# Patient Record
Sex: FEMALE | Race: WHITE | NOT HISPANIC OR LATINO | URBAN - METROPOLITAN AREA
[De-identification: names, ages, dates, MRNs, and addresses within clinical notes are randomized per-mention and may not be internally consistent; named-entity substitution may affect disease eponyms.]

---

## 2019-08-10 ENCOUNTER — EMERGENCY (EMERGENCY)
Facility: HOSPITAL | Age: 64
LOS: 1 days | Discharge: HOME | End: 2019-08-10
Attending: EMERGENCY MEDICINE
Payer: MEDICARE

## 2019-08-10 VITALS
WEIGHT: 177.03 LBS | DIASTOLIC BLOOD PRESSURE: 78 MMHG | TEMPERATURE: 98 F | SYSTOLIC BLOOD PRESSURE: 125 MMHG | RESPIRATION RATE: 16 BRPM | HEIGHT: 66 IN | HEART RATE: 111 BPM | OXYGEN SATURATION: 98 %

## 2019-08-10 VITALS
OXYGEN SATURATION: 97 % | RESPIRATION RATE: 18 BRPM | SYSTOLIC BLOOD PRESSURE: 146 MMHG | TEMPERATURE: 98 F | DIASTOLIC BLOOD PRESSURE: 90 MMHG | HEART RATE: 110 BPM

## 2019-08-10 PROBLEM — Z00.00 ENCOUNTER FOR PREVENTIVE HEALTH EXAMINATION: Status: ACTIVE | Noted: 2019-08-10

## 2019-08-10 PROCEDURE — 99283 EMERGENCY DEPT VISIT LOW MDM: CPT

## 2019-08-10 PROCEDURE — 99282 EMERGENCY DEPT VISIT SF MDM: CPT

## 2019-08-10 NOTE — ED ADULT NURSE NOTE - OBJECTIVE STATEMENT
Pt states felt weak after eating lunch, she was found by staff to have tachycardia and sent here for evaluation  No orders received from MD  Pt was observed for a while symptoms improved, was DC ed home via [facility]

## 2019-08-10 NOTE — ED PROVIDER NOTE - OBJECTIVE STATEMENT
I was sent here bc my bp was high I was sent here bc my bp was high  pt states " I feel fine"  no ha no cp no sob  felt light headed earlier but none now.

## 2019-08-10 NOTE — ED ADULT NURSE NOTE - CHPI ED NUR SYMPTOMS NEG
no dizziness/no fever/no decreased eating/drinking/no chills/no nausea/no tingling/no vomiting/no pain

## 2019-08-10 NOTE — ED PROVIDER NOTE - CLINICAL SUMMARY MEDICAL DECISION MAKING FREE TEXT BOX
pt with htn   sent from [facility] home  bp normal here  pt asking to go home  ambulatory in the department without asst

## 2019-10-11 ENCOUNTER — EMERGENCY (EMERGENCY)
Facility: HOSPITAL | Age: 64
LOS: 1 days | Discharge: HOME | End: 2019-10-11
Attending: EMERGENCY MEDICINE
Payer: MEDICARE

## 2019-10-11 VITALS
RESPIRATION RATE: 18 BRPM | TEMPERATURE: 99 F | SYSTOLIC BLOOD PRESSURE: 117 MMHG | OXYGEN SATURATION: 100 % | DIASTOLIC BLOOD PRESSURE: 73 MMHG | HEART RATE: 93 BPM

## 2019-10-11 VITALS
HEART RATE: 98 BPM | DIASTOLIC BLOOD PRESSURE: 98 MMHG | TEMPERATURE: 98 F | RESPIRATION RATE: 202 BRPM | OXYGEN SATURATION: 99 % | SYSTOLIC BLOOD PRESSURE: 127 MMHG

## 2019-10-11 DIAGNOSIS — F25.9 SCHIZOAFFECTIVE DISORDER, UNSPECIFIED: ICD-10-CM

## 2019-10-11 LAB
ALBUMIN SERPL ELPH-MCNC: 3.5 G/DL — SIGNIFICANT CHANGE UP (ref 3.5–5)
ALP SERPL-CCNC: 92 U/L — SIGNIFICANT CHANGE UP (ref 40–120)
ALT FLD-CCNC: 24 U/L DA — SIGNIFICANT CHANGE UP (ref 10–60)
ANION GAP SERPL CALC-SCNC: 4 MMOL/L — LOW (ref 5–17)
APAP SERPL-MCNC: <2 UG/ML — LOW (ref 10–30)
APPEARANCE UR: CLEAR — SIGNIFICANT CHANGE UP
AST SERPL-CCNC: 23 U/L — SIGNIFICANT CHANGE UP (ref 10–40)
BASOPHILS # BLD AUTO: 0.02 K/UL — SIGNIFICANT CHANGE UP (ref 0–0.2)
BASOPHILS NFR BLD AUTO: 0.3 % — SIGNIFICANT CHANGE UP (ref 0–2)
BILIRUB SERPL-MCNC: 0.2 MG/DL — SIGNIFICANT CHANGE UP (ref 0.2–1.2)
BILIRUB UR-MCNC: NEGATIVE — SIGNIFICANT CHANGE UP
BUN SERPL-MCNC: 11 MG/DL — SIGNIFICANT CHANGE UP (ref 7–18)
CALCIUM SERPL-MCNC: 9.5 MG/DL — SIGNIFICANT CHANGE UP (ref 8.4–10.5)
CHLORIDE SERPL-SCNC: 105 MMOL/L — SIGNIFICANT CHANGE UP (ref 96–108)
CO2 SERPL-SCNC: 29 MMOL/L — SIGNIFICANT CHANGE UP (ref 22–31)
COLOR SPEC: YELLOW — SIGNIFICANT CHANGE UP
CREAT SERPL-MCNC: 0.85 MG/DL — SIGNIFICANT CHANGE UP (ref 0.5–1.3)
DIFF PNL FLD: ABNORMAL
EOSINOPHIL # BLD AUTO: 0.03 K/UL — SIGNIFICANT CHANGE UP (ref 0–0.5)
EOSINOPHIL NFR BLD AUTO: 0.5 % — SIGNIFICANT CHANGE UP (ref 0–6)
ETHANOL SERPL-MCNC: <3 MG/DL — SIGNIFICANT CHANGE UP (ref 0–10)
GLUCOSE SERPL-MCNC: 116 MG/DL — HIGH (ref 70–99)
GLUCOSE UR QL: NEGATIVE — SIGNIFICANT CHANGE UP
HCT VFR BLD CALC: 37.1 % — SIGNIFICANT CHANGE UP (ref 34.5–45)
HGB BLD-MCNC: 11.6 G/DL — SIGNIFICANT CHANGE UP (ref 11.5–15.5)
IMM GRANULOCYTES NFR BLD AUTO: 0.3 % — SIGNIFICANT CHANGE UP (ref 0–1.5)
KETONES UR-MCNC: NEGATIVE — SIGNIFICANT CHANGE UP
LEUKOCYTE ESTERASE UR-ACNC: ABNORMAL
LYMPHOCYTES # BLD AUTO: 2.14 K/UL — SIGNIFICANT CHANGE UP (ref 1–3.3)
LYMPHOCYTES # BLD AUTO: 32.1 % — SIGNIFICANT CHANGE UP (ref 13–44)
MCHC RBC-ENTMCNC: 31.3 GM/DL — LOW (ref 32–36)
MCHC RBC-ENTMCNC: 31.4 PG — SIGNIFICANT CHANGE UP (ref 27–34)
MCV RBC AUTO: 100.5 FL — HIGH (ref 80–100)
MONOCYTES # BLD AUTO: 0.66 K/UL — SIGNIFICANT CHANGE UP (ref 0–0.9)
MONOCYTES NFR BLD AUTO: 9.9 % — SIGNIFICANT CHANGE UP (ref 2–14)
NEUTROPHILS # BLD AUTO: 3.79 K/UL — SIGNIFICANT CHANGE UP (ref 1.8–7.4)
NEUTROPHILS NFR BLD AUTO: 56.9 % — SIGNIFICANT CHANGE UP (ref 43–77)
NITRITE UR-MCNC: NEGATIVE — SIGNIFICANT CHANGE UP
NRBC # BLD: 0 /100 WBCS — SIGNIFICANT CHANGE UP (ref 0–0)
PH UR: 7 — SIGNIFICANT CHANGE UP (ref 5–8)
PLATELET # BLD AUTO: 320 K/UL — SIGNIFICANT CHANGE UP (ref 150–400)
POTASSIUM SERPL-MCNC: 3.8 MMOL/L — SIGNIFICANT CHANGE UP (ref 3.5–5.3)
POTASSIUM SERPL-SCNC: 3.8 MMOL/L — SIGNIFICANT CHANGE UP (ref 3.5–5.3)
PROT SERPL-MCNC: 7.6 G/DL — SIGNIFICANT CHANGE UP (ref 6–8.3)
PROT UR-MCNC: NEGATIVE — SIGNIFICANT CHANGE UP
RBC # BLD: 3.69 M/UL — LOW (ref 3.8–5.2)
RBC # FLD: 13.7 % — SIGNIFICANT CHANGE UP (ref 10.3–14.5)
SALICYLATES SERPL-MCNC: <1.7 MG/DL — LOW (ref 2.8–20)
SODIUM SERPL-SCNC: 138 MMOL/L — SIGNIFICANT CHANGE UP (ref 135–145)
SP GR SPEC: 1.01 — SIGNIFICANT CHANGE UP (ref 1.01–1.02)
TSH SERPL-MCNC: 1.53 UU/ML — SIGNIFICANT CHANGE UP (ref 0.34–4.82)
UROBILINOGEN FLD QL: NEGATIVE — SIGNIFICANT CHANGE UP
WBC # BLD: 6.66 K/UL — SIGNIFICANT CHANGE UP (ref 3.8–10.5)
WBC # FLD AUTO: 6.66 K/UL — SIGNIFICANT CHANGE UP (ref 3.8–10.5)

## 2019-10-11 PROCEDURE — 99285 EMERGENCY DEPT VISIT HI MDM: CPT

## 2019-10-11 PROCEDURE — 71045 X-RAY EXAM CHEST 1 VIEW: CPT | Mod: 26

## 2019-10-11 PROCEDURE — 90792 PSYCH DIAG EVAL W/MED SRVCS: CPT | Mod: GT

## 2019-10-11 RX ORDER — OLANZAPINE 15 MG/1
5 TABLET, FILM COATED ORAL ONCE
Refills: 0 | Status: COMPLETED | OUTPATIENT
Start: 2019-10-11 | End: 2019-10-11

## 2019-10-11 RX ORDER — OLANZAPINE 15 MG/1
1 TABLET, FILM COATED ORAL
Qty: 14 | Refills: 0
Start: 2019-10-11 | End: 2019-10-17

## 2019-10-11 NOTE — ED BEHAVIORAL HEALTH ASSESSMENT NOTE - SUICIDE PROTECTIVE FACTORS
Cultural, spiritual and/or moral attitudes against suicide/Positive therapeutic relationships/Supportive social network of family or friends/Identifies reasons for living

## 2019-10-11 NOTE — ED ADULT TRIAGE NOTE - CHIEF COMPLAINT QUOTE
Patient is requesting to go to a psychiatric facility despite ems stated complaint of weakness.  Has not answered suicidal or homicidal ideation inquiries.  Placed on yellow gown protocol

## 2019-10-11 NOTE — ED BEHAVIORAL HEALTH ASSESSMENT NOTE - HPI (INCLUDE ILLNESS QUALITY, SEVERITY, DURATION, TIMING, CONTEXT, MODIFYING FACTORS, ASSOCIATED SIGNS AND SYMPTOMS)
Patient is a 64 year old female, unemployed, in a relationship and currently living at [facility]. She has a past psychiatric history of schizoaffective disorder, multiple inpatient hospitalizations ([facility]), current outpatient treatment with no history of substance use or suicidality. Psychiatry was consulted for evaluation due to “flat affect” and odd behavior.     Patient was discharged from [facility] psychiatry today after a two week hospitalization. Once she arrived back to [facility], she was complaining of weakness and stating “I feel like I’m dying.” On exam, patient reports she “wasn’t feeling well” and wanted to come to the hospital. Of note, patient is a poor historian. She was perseverating on how she “most [my] psychic ability” and she doesn’t know when she will get it back. She did report being discharged from [facility] today but was unable to give information about her hospitalization. She continues to state “all I know is that I lost my psychic ability” when attempting to get more information. She reports feeling sad due to her lost ability but denies any other symptoms of depression. She denies any recent changes in her mood and denies any symptoms of anxiety, ritchie or psychosis. She denies any SI/HI, stating how she remains hopeful about the future. She discussed how she has been living at the [facility] for over 13 years and likes it there. She reports having a friend, Curry, who is supportive of her.     Spoke with patient’s inpatient psychiatrist, Dr. Winters ([phone]), who reports patient was discharged from [facility] unit today. Reports patient was sent to psychiatry due to “anxiety, panic attacks and delusions.” She reports at baseline, patient is psychotic and delusional about psychic abilities. She is also somatically preoccupied. Reports patient doesn’t like her assisted living facility and likes to come to the ER since she likes being around people. She reports patient is currently taking Zyprexa 5mg bid and is adherent with medication. She denies any history of suicidality or aggressive behavior. Denies any acute safety concerns. Reports patient is at her psychiatric baseline and is to follow-up with her psychiatric at [facility].     Spoke with patient’s boyfriend, Curry Lanier ([phone]) who reports patient is currently at her psychiatric baseline. Reports patient was just discharged from the hospital today due to bizarre behavior. Reports patient thinks she’s a psychic that can read peoples mind, which has been a chronic delusion of hers. Denies having any acute safety concerns. Reports she has a therapist, Dr. Mya Viveros she follows up “religiously” with at the nursing home.

## 2019-10-11 NOTE — ED BEHAVIORAL HEALTH ASSESSMENT NOTE - OTHER PAST PSYCHIATRIC HISTORY (INCLUDE DETAILS REGARDING ONSET, COURSE OF ILLNESS, INPATIENT/OUTPATIENT TREATMENT)
As per HPI. History of schizoaffective disorder, multiple inpatient admissions, recently discharged from [facility] today, 10/11/19. Currently sees outpatient psychiatrist and therapist at [facility].

## 2019-10-11 NOTE — ED PROVIDER NOTE - OBJECTIVE STATEMENT
Patient is a 65 y/o female with PMHx schizoaffective disorder (out of [facility]) sent in from [facility] where she has been residing for the past day for bizarre behavior; she is unable to contribute to hx. Her boyfriend is at bedside providing info. The nursing home paperwork just states weakness and schizoaffective disorder. She is not taking any psychotropic medications besides Remeron. NKDA.

## 2019-10-11 NOTE — ED BEHAVIORAL HEALTH ASSESSMENT NOTE - RISK ASSESSMENT
Additional Suicide Risk Factors (select all that apply)  [  ]Access to lethal means including firearms  [  ]Family history of suicide  [  ]Impulsivity  [ X ] Current or past mood disorder  [ X ] Current or past psychotic disorder  [  ] Current or past PTSD  [  ] Current or past ADHD  [  ] Current or past TBI  [  ] Current or past cluster B personality disorder or traits  [  ] Current or past conduct problems  [ X ] Recent onset of current or past psychiatric disorder  [  ] Family history of psychiatric diagnoses requiring hospitalization     Additional Activating Events (select all that apply)  [  ]Perceived burden on family or others  [  ]Current sexual or physical abuse  [  ]Substance intoxication or withdrawal  [  ]Inadequate social supports  [  ]Hopeless about or dissatisfied with current provider or treatment     Additional Protective Factors (select all that apply)  [ X ] Future plans  [  ] Judaism beliefs  [  ] Beloved pets Low Acute Suicide Risk

## 2019-10-11 NOTE — ED BEHAVIORAL HEALTH ASSESSMENT NOTE - DETAILS
Discharged from [facility] Denies Spoke with attending psychiatrist Spoke with patient’s inpatient psychiatrist, Dr. Winters ([phone]

## 2019-10-11 NOTE — ED PROVIDER NOTE - PROGRESS NOTE DETAILS
Discussed with telepsych. Will see pt. joellen telepsych who saw/examined/dw the inpt psychiatry md at [facility] who was seeing her today and prior. pt is at baseline, should be on zyprexa 5mg bid. no finding right now that explains weakness but pt wo any objective findings - will dc back to nursing facility. pt does not know which pharmacy - likely causing pt harm if pt does not get medication today. will print paper presciption instead of erx for this reason.

## 2019-10-11 NOTE — ED PROVIDER NOTE - PATIENT PORTAL LINK FT
You can access the FollowMyHealth Patient Portal offered by [facility] by registering at the following website: [email or web address]. By joining [employer]’s FollowMyHealth portal, you will also be able to view your health information using other applications (apps) compatible with our system.

## 2019-10-11 NOTE — ED BEHAVIORAL HEALTH ASSESSMENT NOTE - OTHER
Inpatient psychiatrist, Dr. Winters Nursing home Recently discharged from inpatient at [facility]

## 2019-10-11 NOTE — ED ADULT NURSE NOTE - OBJECTIVE STATEMENT
axox3 ,nad , brought for assisted living with boyfriend at bed side for bizarre behavior , pt on arrival 1;1 observation , ROAM alert , no suicidal or homicidal thoughts since arrival

## 2019-10-11 NOTE — ED BEHAVIORAL HEALTH ASSESSMENT NOTE - ACTIVATING EVENTS/STRESSORS
Recent inpatient discharge/Change in provider or treatment (i.e., medications, psychotherapy, milieu)

## 2019-10-11 NOTE — ED PROVIDER NOTE - CLINICAL SUMMARY MEDICAL DECISION MAKING FREE TEXT BOX
63 y/o female pt presents with bizarre behavior. Will eval for source of infection. Basic bloodwork, consult psych for observation.

## 2019-10-11 NOTE — ED BEHAVIORAL HEALTH ASSESSMENT NOTE - DIFFERENTIAL
Details: ___________  [  ] Safety plan discussed with patient  [  ] Education provided regarding environmental safety / lethal means restriction  [  ] Provision of National Suicide Prevention Lifeline 1-733-120-SSHW (4183)     C-SSRS Screener     1. Have you ever wished to be dead or wished you could go to sleep and not wake up?  [  ]Yes, [ X ]No, [  ]Unable to Assess     2. Have you actually had any thoughts of killing yourself?   [  ]Yes, [ X ]No, [  ]Unable to Assess     If answer is “No” for 1 and 2, stop here. If answer is “Yes” to 1 or 2, proceed to 3.     3. Have you been thinking about how you might kill yourself?  [  ]Yes, [  ]No, [  ]Unable to Assess     4. Have you had these thoughts and had some intention of acting on them?  [  ]Yes, [  ]No, [  ]Unable to Assess     5. Have you started to work out or worked out the details of how to kill yourself? Do you intend to carry out this plan?  [  ]Yes, [  ]No, [  ]Unable to Assess     6. Have you ever done anything, started to do anything, or prepared to do anything to end your life? If so, was it in the past 3 months?  [  ]Yes, [  ]No, [  ]Unable to Assess    Details:_________________________________    Acute Suicide Risk  (  ) High   (  ) Moderate   (  ) Low   (  ) Unable to determine   Rationale ___________    Elevated Chronic Risk   (  ) Yes ___________  Details ___________  (  ) No   ___________

## 2019-10-11 NOTE — ED BEHAVIORAL HEALTH ASSESSMENT NOTE - MARITAL STATUS
----- Message from Ira Bang MD sent at 11/8/2018  5:51 PM CST -----  Lesions 1 and 2, referral to Dr Fernandez for Mohs (recently under her care)  Lesions 3 and 5, schedule for E&S and ED&C respectively  Lesion 4, negative biopsy margins, will reexamine at FU  Lesion 6 benign    FINAL PATHOLOGIC DIAGNOSIS  1. Skin, right forehead, shave biopsy:  - BASAL CELL CARCINOMA WITH MIXED SUPERFICIAL AND NODULAR GROWTH PATTERN.  - THE TUMOR CLOSELY APPROACHES THE DEEP BIOPSY MARGIN.    2. Skin, right nasal tip, shave biopsy:  - BASAL CELL CARCINOMA WITH NODULAR GROWTH PATTERN.  - THE TUMOR EXTENDS TO THE DEEP AND LATERAL BIOPSY MARGINS.    3. Skin, left infra-auricular neck, shave biopsy:  - BASAL CELL CARCINOMA WITH MIXED SUPERFICIAL AND NODULAR GROWTH PATTERN.  - THE TUMOR EXTENDS TO A LATERAL BIOPSY MARGIN.    4. Skin, left postauricular neck, shave biopsy:  - SQUAMOUS CELL CARCINOMA IN SITU/ BOWEN'S DISEASE.  - MARGINS ARE NEGATIVE IN THE PLANES OF SECTION.    5. Skin, left shoulder, shave biopsy:  - BASAL CELL CARCINOMA WITH MIXED SUPERFICIAL AND NODULAR GROWTH PATTERN.  - THE TUMOR EXTENDS TO A LATERAL BIOPSY MARGIN.    6. Skin, right upper arm, shave biopsy:  - IRRITATED SEBORRHEIC KERATOSIS.    
Single

## 2019-10-11 NOTE — ED BEHAVIORAL HEALTH ASSESSMENT NOTE - DESCRIPTION
Patient was calm and cooperative. She was accompanied by her friend, Curry. She was cooperative with labs and changing into her gown. She was observed to be anxious however able to be redirectable.  She has not been agitated or aggressive. Denies Patient currently lives at [facility]

## 2019-10-11 NOTE — ED PROVIDER NOTE - PHYSICAL EXAMINATION
Slow movements.   Repeated statements that she was being given medications under the name "Valois" but they were calling her "Sachi" multiple times. Patient cannot be redirected or contribute to history.  Significantly flat affect.

## 2019-10-11 NOTE — ED PROVIDER NOTE - NSFOLLOWUPINSTRUCTIONS_ED_ALL_ED_FT
You should be taking zyprexa 5 mg - 2x per day according to your psychiatrist.    Please follow up with your personal medical doctor in 24-48 hours.   Bring results from today to your visit.  If your symptoms change, get worse or if you have any new symptoms, come to the ER right away.  If you have any questions, call the ER at the phone number on this page.

## 2019-10-11 NOTE — ED BEHAVIORAL HEALTH ASSESSMENT NOTE - SUMMARY
Patient is a 64 year old female, unemployed, in a relationship and currently living at [facility] with a past psychiatric history of schizoaffective disorder, multiple inpatient hospitalizations [facility] current outpatient treatment with no history of substance use or suicidality who was consulted for evaluation due to “flat affect” and odd behavior.  On exam, patient continues to be delusional about her psychic abilities. As per her inpatient psychiatrist and her friend this is her baseline. Patient is currently on psychiatric medication (Zyprexa 5mg po bid) and has psychiatric follow-up at [facility]. Patient appears to be at her psychiatric baseline. She denies any symptoms of depression, ritchie or psychosis. Patient does not meet criteria for involuntary hospitalization and is not interested in voluntary hospitalization. She denies any SI/HI and friend/inpatient psych have no acute safety concerns. Patient is psychiatrically stable for discharge.    Plan:  -Patient is psychiatrically stable for discharge  - Patient does not meet criteria for involuntary hospitalization and is not interested in voluntary hospitalization  -Continue Zyprexa 5mg po bid  -Patient has a psychiatrist and therapist at [facility]  -In case of emergency, patient was educated to call 911 and/or go to the nearest ER

## 2019-10-11 NOTE — ED ADULT NURSE NOTE - NSIMPLEMENTINTERV_GEN_ALL_ED
Implemented All Universal Safety Interventions:  [place] to call system. Call bell, personal items and telephone within reach. Instruct patient to call for assistance. Room bathroom lighting operational. Non-slip footwear when patient is off stretcher. Physically safe environment: no spills, clutter or unnecessary equipment. Stretcher in lowest position, wheels locked, appropriate side rails in place.

## 2019-10-12 PROCEDURE — 85027 COMPLETE CBC AUTOMATED: CPT

## 2019-10-12 PROCEDURE — 84443 ASSAY THYROID STIM HORMONE: CPT

## 2019-10-12 PROCEDURE — 80307 DRUG TEST PRSMV CHEM ANLYZR: CPT

## 2019-10-12 PROCEDURE — 99285 EMERGENCY DEPT VISIT HI MDM: CPT | Mod: 25

## 2019-10-12 PROCEDURE — 81001 URINALYSIS AUTO W/SCOPE: CPT

## 2019-10-12 PROCEDURE — 80053 COMPREHEN METABOLIC PANEL: CPT

## 2019-10-12 PROCEDURE — 71045 X-RAY EXAM CHEST 1 VIEW: CPT

## 2019-10-12 PROCEDURE — 36415 COLL VENOUS BLD VENIPUNCTURE: CPT

## 2019-10-12 RX ADMIN — OLANZAPINE 5 MILLIGRAM(S): 15 TABLET, FILM COATED ORAL at 00:55

## 2019-12-04 ENCOUNTER — EMERGENCY (EMERGENCY)
Facility: HOSPITAL | Age: 64
LOS: 1 days | Discharge: HOME | End: 2019-12-04
Attending: EMERGENCY MEDICINE
Payer: MEDICARE

## 2019-12-04 VITALS
RESPIRATION RATE: 16 BRPM | DIASTOLIC BLOOD PRESSURE: 72 MMHG | TEMPERATURE: 98 F | OXYGEN SATURATION: 100 % | SYSTOLIC BLOOD PRESSURE: 132 MMHG | HEART RATE: 97 BPM

## 2019-12-04 VITALS
OXYGEN SATURATION: 98 % | SYSTOLIC BLOOD PRESSURE: 131 MMHG | DIASTOLIC BLOOD PRESSURE: 84 MMHG | RESPIRATION RATE: 18 BRPM | WEIGHT: 179.9 LBS | HEART RATE: 96 BPM | TEMPERATURE: 98 F

## 2019-12-04 PROBLEM — F25.9 SCHIZOAFFECTIVE DISORDER, UNSPECIFIED: Chronic | Status: ACTIVE | Noted: 2019-10-11

## 2019-12-04 LAB
ALBUMIN SERPL ELPH-MCNC: 3.5 G/DL — SIGNIFICANT CHANGE UP (ref 3.5–5)
ALP SERPL-CCNC: 87 U/L — SIGNIFICANT CHANGE UP (ref 40–120)
ALT FLD-CCNC: 11 U/L DA — SIGNIFICANT CHANGE UP (ref 10–60)
ANION GAP SERPL CALC-SCNC: 4 MMOL/L — LOW (ref 5–17)
AST SERPL-CCNC: 10 U/L — SIGNIFICANT CHANGE UP (ref 10–40)
BASOPHILS # BLD AUTO: 0.04 K/UL — SIGNIFICANT CHANGE UP (ref 0–0.2)
BASOPHILS NFR BLD AUTO: 0.4 % — SIGNIFICANT CHANGE UP (ref 0–2)
BILIRUB SERPL-MCNC: 0.2 MG/DL — SIGNIFICANT CHANGE UP (ref 0.2–1.2)
BUN SERPL-MCNC: 12 MG/DL — SIGNIFICANT CHANGE UP (ref 7–18)
CALCIUM SERPL-MCNC: 9.2 MG/DL — SIGNIFICANT CHANGE UP (ref 8.4–10.5)
CHLORIDE SERPL-SCNC: 111 MMOL/L — HIGH (ref 96–108)
CO2 SERPL-SCNC: 26 MMOL/L — SIGNIFICANT CHANGE UP (ref 22–31)
CREAT SERPL-MCNC: 0.75 MG/DL — SIGNIFICANT CHANGE UP (ref 0.5–1.3)
EOSINOPHIL # BLD AUTO: 0.03 K/UL — SIGNIFICANT CHANGE UP (ref 0–0.5)
EOSINOPHIL NFR BLD AUTO: 0.3 % — SIGNIFICANT CHANGE UP (ref 0–6)
GLUCOSE SERPL-MCNC: 89 MG/DL — SIGNIFICANT CHANGE UP (ref 70–99)
HCT VFR BLD CALC: 37.8 % — SIGNIFICANT CHANGE UP (ref 34.5–45)
HGB BLD-MCNC: 11.6 G/DL — SIGNIFICANT CHANGE UP (ref 11.5–15.5)
IMM GRANULOCYTES NFR BLD AUTO: 0.3 % — SIGNIFICANT CHANGE UP (ref 0–1.5)
LYMPHOCYTES # BLD AUTO: 27.6 % — SIGNIFICANT CHANGE UP (ref 13–44)
LYMPHOCYTES # BLD AUTO: 3.09 K/UL — SIGNIFICANT CHANGE UP (ref 1–3.3)
MCHC RBC-ENTMCNC: 30.1 PG — SIGNIFICANT CHANGE UP (ref 27–34)
MCHC RBC-ENTMCNC: 30.7 GM/DL — LOW (ref 32–36)
MCV RBC AUTO: 98.2 FL — SIGNIFICANT CHANGE UP (ref 80–100)
MONOCYTES # BLD AUTO: 0.69 K/UL — SIGNIFICANT CHANGE UP (ref 0–0.9)
MONOCYTES NFR BLD AUTO: 6.2 % — SIGNIFICANT CHANGE UP (ref 2–14)
NEUTROPHILS # BLD AUTO: 7.32 K/UL — SIGNIFICANT CHANGE UP (ref 1.8–7.4)
NEUTROPHILS NFR BLD AUTO: 65.2 % — SIGNIFICANT CHANGE UP (ref 43–77)
NRBC # BLD: 0 /100 WBCS — SIGNIFICANT CHANGE UP (ref 0–0)
PLATELET # BLD AUTO: 293 K/UL — SIGNIFICANT CHANGE UP (ref 150–400)
POTASSIUM SERPL-MCNC: 3.9 MMOL/L — SIGNIFICANT CHANGE UP (ref 3.5–5.3)
POTASSIUM SERPL-SCNC: 3.9 MMOL/L — SIGNIFICANT CHANGE UP (ref 3.5–5.3)
PROT SERPL-MCNC: 7.2 G/DL — SIGNIFICANT CHANGE UP (ref 6–8.3)
RBC # BLD: 3.85 M/UL — SIGNIFICANT CHANGE UP (ref 3.8–5.2)
RBC # FLD: 14.4 % — SIGNIFICANT CHANGE UP (ref 10.3–14.5)
SODIUM SERPL-SCNC: 141 MMOL/L — SIGNIFICANT CHANGE UP (ref 135–145)
WBC # BLD: 11.2 K/UL — HIGH (ref 3.8–10.5)
WBC # FLD AUTO: 11.2 K/UL — HIGH (ref 3.8–10.5)

## 2019-12-04 PROCEDURE — 82962 GLUCOSE BLOOD TEST: CPT

## 2019-12-04 PROCEDURE — 36415 COLL VENOUS BLD VENIPUNCTURE: CPT

## 2019-12-04 PROCEDURE — 85027 COMPLETE CBC AUTOMATED: CPT

## 2019-12-04 PROCEDURE — 80053 COMPREHEN METABOLIC PANEL: CPT

## 2019-12-04 PROCEDURE — 99283 EMERGENCY DEPT VISIT LOW MDM: CPT | Mod: 25

## 2019-12-04 PROCEDURE — 99285 EMERGENCY DEPT VISIT HI MDM: CPT | Mod: 25

## 2019-12-04 PROCEDURE — 93005 ELECTROCARDIOGRAM TRACING: CPT

## 2019-12-04 PROCEDURE — 99283 EMERGENCY DEPT VISIT LOW MDM: CPT

## 2019-12-04 NOTE — ED PROVIDER NOTE - NSFOLLOWUPINSTRUCTIONS_ED_ALL_ED_FT
IMPORTANT INSTRUCTIONS FROM Dr. TRIANA:    Please follow up with your personal medical doctor in 24-48 hours.   Bring results from today to your visit.      If your symptoms change, get worse or if you have any new symptoms, come to the ER right away.  If you have any questions, call the ER at the phone number on this page.

## 2019-12-04 NOTE — ED PROVIDER NOTE - OBJECTIVE STATEMENT
Patient is a 63 y/o female with PMHx schizoaffective disorder sent from [facility] for drowsiness today. As per Nurse Ree of [facility], pt was drowsy for 20 minutes and the reason was unknown so they called 911. At the time she was arousable to voice. There were no changes to her medicine. Patient is acting normally otherwise. There are no other acute complaints. Patient is a 63 y/o female with PMHx schizoaffective disorder sent from [facility] for drowsiness today. As per Nurse eRe of [facility], pt was drowsy for 20 minutes and the reason was unknown so they called 911. At the time she was arousable to voice. There were no changes to her medicine. Patient is acting normally otherwise and is normal at this time. Pt is known to me from prior visit. There are no other acute complaints.

## 2019-12-04 NOTE — ED PROVIDER NOTE - DR. NAME
[de-identified] : 
Lawrence Abdi)

## 2019-12-04 NOTE — ED ADULT NURSE NOTE - NSIMPLEMENTINTERV_GEN_ALL_ED
Implemented All Fall Risk Interventions:  [place] to call system. Call bell, personal items and telephone within reach. Instruct patient to call for assistance. Room bathroom lighting operational. Non-slip footwear when patient is off stretcher. Physically safe environment: no spills, clutter or unnecessary equipment. Stretcher in lowest position, wheels locked, appropriate side rails in place. Provide visual cue, wrist band, yellow gown, etc. Monitor gait and stability. Monitor for mental status changes and reorient to person, place, and time. Review medications for side effects contributing to fall risk. Reinforce activity limits and safety measures with patient and family.

## 2019-12-04 NOTE — ED ADULT NURSE NOTE - CHPI ED NUR SYMPTOMS NEG
no vomiting/no weakness/no dizziness/no fever/no loss of consciousness/no blurred vision/no nausea/no numbness

## 2019-12-04 NOTE — ED ADULT NURSE NOTE - OBJECTIVE STATEMENT
Pt aox2, BIB EMS from [facility] c/o lethargy this morning, as per EMS. Pt aox2 on arrival to ED. Denies pain, SOB, fall, trauma, n/v/f, CP.

## 2019-12-04 NOTE — ED PROVIDER NOTE - PATIENT PORTAL LINK FT
You can access the FollowMyHealth Patient Portal offered by [facility] by registering at the following website: [email or web address]. By joining "[employer]’s FollowMyHealth portal, you will also be able to view your health information using other applications (apps) compatible with our system.

## 2019-12-04 NOTE — ED PROVIDER NOTE - PROGRESS NOTE DETAILS
pt completely at baseline - probably an acting out behavior. will dc back to [facility]

## 2019-12-04 NOTE — ED PROVIDER NOTE - CLINICAL SUMMARY MEDICAL DECISION MAKING FREE TEXT BOX
63 y/o female presents with drowsiness. Patient at baseline as I evaluated her. Much more likely to be related to schizoaffective disorder than a non psychiatric cause. Will do screening, EKG, basic bloodwork, likely discharge patient.

## 2020-05-24 ENCOUNTER — INPATIENT (INPATIENT)
Facility: HOSPITAL | Age: 65
LOS: 4 days | Discharge: NURSING, REHAB OR LONG-TERM CARE | DRG: 885 | End: 2020-05-29
Attending: INTERNAL MEDICINE | Admitting: INTERNAL MEDICINE
Payer: MEDICARE

## 2020-05-24 VITALS
TEMPERATURE: 98 F | RESPIRATION RATE: 16 BRPM | DIASTOLIC BLOOD PRESSURE: 73 MMHG | HEART RATE: 80 BPM | OXYGEN SATURATION: 97 % | SYSTOLIC BLOOD PRESSURE: 111 MMHG

## 2020-05-24 LAB
ALBUMIN SERPL ELPH-MCNC: 3.2 G/DL — LOW (ref 3.5–5)
ALP SERPL-CCNC: 103 U/L — SIGNIFICANT CHANGE UP (ref 40–120)
ALT FLD-CCNC: 16 U/L DA — SIGNIFICANT CHANGE UP (ref 10–60)
ANION GAP SERPL CALC-SCNC: 6 MMOL/L — SIGNIFICANT CHANGE UP (ref 5–17)
APTT BLD: 43.5 SEC — HIGH (ref 27.5–36.3)
AST SERPL-CCNC: 16 U/L — SIGNIFICANT CHANGE UP (ref 10–40)
BASOPHILS # BLD AUTO: 0.03 K/UL — SIGNIFICANT CHANGE UP (ref 0–0.2)
BASOPHILS NFR BLD AUTO: 0.4 % — SIGNIFICANT CHANGE UP (ref 0–2)
BILIRUB SERPL-MCNC: 0.3 MG/DL — SIGNIFICANT CHANGE UP (ref 0.2–1.2)
BUN SERPL-MCNC: 13 MG/DL — SIGNIFICANT CHANGE UP (ref 7–18)
CALCIUM SERPL-MCNC: 9 MG/DL — SIGNIFICANT CHANGE UP (ref 8.4–10.5)
CHLORIDE SERPL-SCNC: 110 MMOL/L — HIGH (ref 96–108)
CO2 SERPL-SCNC: 27 MMOL/L — SIGNIFICANT CHANGE UP (ref 22–31)
CREAT SERPL-MCNC: 0.72 MG/DL — SIGNIFICANT CHANGE UP (ref 0.5–1.3)
EOSINOPHIL # BLD AUTO: 0 K/UL — SIGNIFICANT CHANGE UP (ref 0–0.5)
EOSINOPHIL NFR BLD AUTO: 0 % — SIGNIFICANT CHANGE UP (ref 0–6)
GLUCOSE SERPL-MCNC: 89 MG/DL — SIGNIFICANT CHANGE UP (ref 70–99)
HCT VFR BLD CALC: 37.9 % — SIGNIFICANT CHANGE UP (ref 34.5–45)
HGB BLD-MCNC: 11.8 G/DL — SIGNIFICANT CHANGE UP (ref 11.5–15.5)
IMM GRANULOCYTES NFR BLD AUTO: 0.1 % — SIGNIFICANT CHANGE UP (ref 0–1.5)
INR BLD: 1.03 RATIO — SIGNIFICANT CHANGE UP (ref 0.88–1.16)
LYMPHOCYTES # BLD AUTO: 3.65 K/UL — HIGH (ref 1–3.3)
LYMPHOCYTES # BLD AUTO: 45.6 % — HIGH (ref 13–44)
MCHC RBC-ENTMCNC: 30.3 PG — SIGNIFICANT CHANGE UP (ref 27–34)
MCHC RBC-ENTMCNC: 31.1 GM/DL — LOW (ref 32–36)
MCV RBC AUTO: 97.2 FL — SIGNIFICANT CHANGE UP (ref 80–100)
MONOCYTES # BLD AUTO: 0.81 K/UL — SIGNIFICANT CHANGE UP (ref 0–0.9)
MONOCYTES NFR BLD AUTO: 10.1 % — SIGNIFICANT CHANGE UP (ref 2–14)
NEUTROPHILS # BLD AUTO: 3.5 K/UL — SIGNIFICANT CHANGE UP (ref 1.8–7.4)
NEUTROPHILS NFR BLD AUTO: 43.8 % — SIGNIFICANT CHANGE UP (ref 43–77)
NRBC # BLD: 0 /100 WBCS — SIGNIFICANT CHANGE UP (ref 0–0)
PLATELET # BLD AUTO: 284 K/UL — SIGNIFICANT CHANGE UP (ref 150–400)
POTASSIUM SERPL-MCNC: 4.2 MMOL/L — SIGNIFICANT CHANGE UP (ref 3.5–5.3)
POTASSIUM SERPL-SCNC: 4.2 MMOL/L — SIGNIFICANT CHANGE UP (ref 3.5–5.3)
PROT SERPL-MCNC: 7.2 G/DL — SIGNIFICANT CHANGE UP (ref 6–8.3)
PROTHROM AB SERPL-ACNC: 11.6 SEC — SIGNIFICANT CHANGE UP (ref 10–12.9)
RBC # BLD: 3.9 M/UL — SIGNIFICANT CHANGE UP (ref 3.8–5.2)
RBC # FLD: 15.3 % — HIGH (ref 10.3–14.5)
SODIUM SERPL-SCNC: 143 MMOL/L — SIGNIFICANT CHANGE UP (ref 135–145)
WBC # BLD: 8 K/UL — SIGNIFICANT CHANGE UP (ref 3.8–10.5)
WBC # FLD AUTO: 8 K/UL — SIGNIFICANT CHANGE UP (ref 3.8–10.5)

## 2020-05-24 PROCEDURE — 71045 X-RAY EXAM CHEST 1 VIEW: CPT | Mod: 26

## 2020-05-24 RX ORDER — SODIUM CHLORIDE 9 MG/ML
1000 INJECTION INTRAMUSCULAR; INTRAVENOUS; SUBCUTANEOUS ONCE
Refills: 0 | Status: COMPLETED | OUTPATIENT
Start: 2020-05-24 | End: 2020-05-24

## 2020-05-24 RX ADMIN — SODIUM CHLORIDE 1000 MILLILITER(S): 9 INJECTION INTRAMUSCULAR; INTRAVENOUS; SUBCUTANEOUS at 22:51

## 2020-05-24 RX ADMIN — SODIUM CHLORIDE 1000 MILLILITER(S): 9 INJECTION INTRAMUSCULAR; INTRAVENOUS; SUBCUTANEOUS at 23:48

## 2020-05-24 RX ADMIN — Medication 1 MILLIGRAM(S): at 23:48

## 2020-05-24 NOTE — ED PROVIDER NOTE - OBJECTIVE STATEMENT
transferred from [facility] for AMS. on evaluation pt is oriented to self and her , states feels generalized weakness.  [facility] notes reported pt with fever.  No chest pain, no shortness of breath, no reported vomiting.

## 2020-05-24 NOTE — ED PROVIDER NOTE - CLINICAL SUMMARY MEDICAL DECISION MAKING FREE TEXT BOX
Informed by Dr. Pérez to admit to his service for fever monitoring and AMS at NH. MAR endorsed. Pt agrees with admission. I had a detailed discussion with the patient and/or guardian regarding the historical points, exam findings, and any diagnostic results supporting the admit diagnosis.

## 2020-05-24 NOTE — ED ADULT NURSE NOTE - NSFALLRSKHRMRISKTYPE_ED_ALL_ED
coagulation(Bleeding disorder R/T clinical cond/anti-coags)/bones(Osteoporosis,prev fx,steroid use,metastatic bone ca)/surgery(72hr postop, recent leg amputee, sig. abd/thor surg)

## 2020-05-24 NOTE — ED PROVIDER NOTE - PROGRESS NOTE DETAILS
Pt is anxious, states having panic attack. Ativan ordered for anxiolysis and facilitate diagnostics.

## 2020-05-24 NOTE — ED ADULT NURSE NOTE - OBJECTIVE STATEMENT
Pt from assisted living, lethargic, oriented to self and place. breathing unlabored room air. skin quit warm to touch. Rectal T.98.5.

## 2020-05-25 DIAGNOSIS — R41.82 ALTERED MENTAL STATUS, UNSPECIFIED: ICD-10-CM

## 2020-05-25 DIAGNOSIS — E03.9 HYPOTHYROIDISM, UNSPECIFIED: ICD-10-CM

## 2020-05-25 DIAGNOSIS — Z29.9 ENCOUNTER FOR PROPHYLACTIC MEASURES, UNSPECIFIED: ICD-10-CM

## 2020-05-25 DIAGNOSIS — F25.9 SCHIZOAFFECTIVE DISORDER, UNSPECIFIED: ICD-10-CM

## 2020-05-25 DIAGNOSIS — I10 ESSENTIAL (PRIMARY) HYPERTENSION: ICD-10-CM

## 2020-05-25 LAB
24R-OH-CALCIDIOL SERPL-MCNC: 29.7 NG/ML — LOW (ref 30–80)
A1C WITH ESTIMATED AVERAGE GLUCOSE RESULT: 6 % — HIGH (ref 4–5.6)
ALBUMIN SERPL ELPH-MCNC: 3.1 G/DL — LOW (ref 3.5–5)
ALP SERPL-CCNC: 85 U/L — SIGNIFICANT CHANGE UP (ref 40–120)
ALT FLD-CCNC: 14 U/L DA — SIGNIFICANT CHANGE UP (ref 10–60)
AMMONIA BLD-MCNC: 66 UMOL/L — HIGH (ref 11–32)
ANION GAP SERPL CALC-SCNC: 6 MMOL/L — SIGNIFICANT CHANGE UP (ref 5–17)
APPEARANCE UR: CLEAR — SIGNIFICANT CHANGE UP
AST SERPL-CCNC: 11 U/L — SIGNIFICANT CHANGE UP (ref 10–40)
BASOPHILS # BLD AUTO: 0.02 K/UL — SIGNIFICANT CHANGE UP (ref 0–0.2)
BASOPHILS NFR BLD AUTO: 0.4 % — SIGNIFICANT CHANGE UP (ref 0–2)
BILIRUB SERPL-MCNC: 0.4 MG/DL — SIGNIFICANT CHANGE UP (ref 0.2–1.2)
BILIRUB UR-MCNC: NEGATIVE — SIGNIFICANT CHANGE UP
BUN SERPL-MCNC: 11 MG/DL — SIGNIFICANT CHANGE UP (ref 7–18)
CALCIUM SERPL-MCNC: 9.3 MG/DL — SIGNIFICANT CHANGE UP (ref 8.4–10.5)
CHLORIDE SERPL-SCNC: 109 MMOL/L — HIGH (ref 96–108)
CHOLEST SERPL-MCNC: 169 MG/DL — SIGNIFICANT CHANGE UP (ref 10–199)
CO2 SERPL-SCNC: 28 MMOL/L — SIGNIFICANT CHANGE UP (ref 22–31)
COLOR SPEC: YELLOW — SIGNIFICANT CHANGE UP
CREAT SERPL-MCNC: 0.7 MG/DL — SIGNIFICANT CHANGE UP (ref 0.5–1.3)
DIFF PNL FLD: ABNORMAL
EOSINOPHIL # BLD AUTO: 0.12 K/UL — SIGNIFICANT CHANGE UP (ref 0–0.5)
EOSINOPHIL NFR BLD AUTO: 2.2 % — SIGNIFICANT CHANGE UP (ref 0–6)
ESTIMATED AVERAGE GLUCOSE: 126 MG/DL — HIGH (ref 68–114)
FOLATE SERPL-MCNC: 17.9 NG/ML — SIGNIFICANT CHANGE UP
GLUCOSE SERPL-MCNC: 85 MG/DL — SIGNIFICANT CHANGE UP (ref 70–99)
GLUCOSE UR QL: NEGATIVE — SIGNIFICANT CHANGE UP
HCT VFR BLD CALC: 37 % — SIGNIFICANT CHANGE UP (ref 34.5–45)
HDLC SERPL-MCNC: 67 MG/DL — SIGNIFICANT CHANGE UP
HGB BLD-MCNC: 11.4 G/DL — LOW (ref 11.5–15.5)
IMM GRANULOCYTES NFR BLD AUTO: 0.2 % — SIGNIFICANT CHANGE UP (ref 0–1.5)
KETONES UR-MCNC: NEGATIVE — SIGNIFICANT CHANGE UP
LEUKOCYTE ESTERASE UR-ACNC: NEGATIVE — SIGNIFICANT CHANGE UP
LIPID PNL WITH DIRECT LDL SERPL: 84 MG/DL — SIGNIFICANT CHANGE UP
LYMPHOCYTES # BLD AUTO: 2.6 K/UL — SIGNIFICANT CHANGE UP (ref 1–3.3)
LYMPHOCYTES # BLD AUTO: 47.1 % — HIGH (ref 13–44)
MAGNESIUM SERPL-MCNC: 2.4 MG/DL — SIGNIFICANT CHANGE UP (ref 1.6–2.6)
MCHC RBC-ENTMCNC: 29.7 PG — SIGNIFICANT CHANGE UP (ref 27–34)
MCHC RBC-ENTMCNC: 30.8 GM/DL — LOW (ref 32–36)
MCV RBC AUTO: 96.4 FL — SIGNIFICANT CHANGE UP (ref 80–100)
MONOCYTES # BLD AUTO: 0.47 K/UL — SIGNIFICANT CHANGE UP (ref 0–0.9)
MONOCYTES NFR BLD AUTO: 8.5 % — SIGNIFICANT CHANGE UP (ref 2–14)
NEUTROPHILS # BLD AUTO: 2.3 K/UL — SIGNIFICANT CHANGE UP (ref 1.8–7.4)
NEUTROPHILS NFR BLD AUTO: 41.6 % — LOW (ref 43–77)
NITRITE UR-MCNC: NEGATIVE — SIGNIFICANT CHANGE UP
NRBC # BLD: 0 /100 WBCS — SIGNIFICANT CHANGE UP (ref 0–0)
PH UR: 6 — SIGNIFICANT CHANGE UP (ref 5–8)
PHOSPHATE SERPL-MCNC: 4 MG/DL — SIGNIFICANT CHANGE UP (ref 2.5–4.5)
PLATELET # BLD AUTO: 271 K/UL — SIGNIFICANT CHANGE UP (ref 150–400)
POTASSIUM SERPL-MCNC: 3.5 MMOL/L — SIGNIFICANT CHANGE UP (ref 3.5–5.3)
POTASSIUM SERPL-SCNC: 3.5 MMOL/L — SIGNIFICANT CHANGE UP (ref 3.5–5.3)
PROT SERPL-MCNC: 6.7 G/DL — SIGNIFICANT CHANGE UP (ref 6–8.3)
PROT UR-MCNC: NEGATIVE — SIGNIFICANT CHANGE UP
RBC # BLD: 3.84 M/UL — SIGNIFICANT CHANGE UP (ref 3.8–5.2)
RBC # FLD: 15.2 % — HIGH (ref 10.3–14.5)
SARS-COV-2 RNA SPEC QL NAA+PROBE: SIGNIFICANT CHANGE UP
SODIUM SERPL-SCNC: 143 MMOL/L — SIGNIFICANT CHANGE UP (ref 135–145)
SP GR SPEC: 1.02 — SIGNIFICANT CHANGE UP (ref 1.01–1.02)
T PALLIDUM AB TITR SER: NEGATIVE — SIGNIFICANT CHANGE UP
TOTAL CHOLESTEROL/HDL RATIO MEASUREMENT: 2.5 RATIO — LOW (ref 3.3–7.1)
TRIGL SERPL-MCNC: 88 MG/DL — SIGNIFICANT CHANGE UP (ref 10–149)
TSH SERPL-MCNC: 1.76 UU/ML — SIGNIFICANT CHANGE UP (ref 0.34–4.82)
UROBILINOGEN FLD QL: NEGATIVE — SIGNIFICANT CHANGE UP
VIT B12 SERPL-MCNC: 462 PG/ML — SIGNIFICANT CHANGE UP (ref 232–1245)
WBC # BLD: 5.52 K/UL — SIGNIFICANT CHANGE UP (ref 3.8–10.5)
WBC # FLD AUTO: 5.52 K/UL — SIGNIFICANT CHANGE UP (ref 3.8–10.5)

## 2020-05-25 PROCEDURE — 99285 EMERGENCY DEPT VISIT HI MDM: CPT

## 2020-05-25 PROCEDURE — 73522 X-RAY EXAM HIPS BI 3-4 VIEWS: CPT | Mod: 26

## 2020-05-25 PROCEDURE — 70450 CT HEAD/BRAIN W/O DYE: CPT | Mod: 26

## 2020-05-25 PROCEDURE — 74018 RADEX ABDOMEN 1 VIEW: CPT | Mod: 26

## 2020-05-25 RX ORDER — CLONAZEPAM 1 MG
0.5 TABLET ORAL ONCE
Refills: 0 | Status: DISCONTINUED | OUTPATIENT
Start: 2020-05-25 | End: 2020-05-25

## 2020-05-25 RX ORDER — METOPROLOL TARTRATE 50 MG
50 TABLET ORAL DAILY
Refills: 0 | Status: DISCONTINUED | OUTPATIENT
Start: 2020-05-25 | End: 2020-05-29

## 2020-05-25 RX ORDER — CHOLECALCIFEROL (VITAMIN D3) 125 MCG
1000 CAPSULE ORAL DAILY
Refills: 0 | Status: DISCONTINUED | OUTPATIENT
Start: 2020-05-25 | End: 2020-05-29

## 2020-05-25 RX ORDER — ENOXAPARIN SODIUM 100 MG/ML
40 INJECTION SUBCUTANEOUS DAILY
Refills: 0 | Status: DISCONTINUED | OUTPATIENT
Start: 2020-05-25 | End: 2020-05-29

## 2020-05-25 RX ORDER — LEVOTHYROXINE SODIUM 125 MCG
75 TABLET ORAL DAILY
Refills: 0 | Status: DISCONTINUED | OUTPATIENT
Start: 2020-05-25 | End: 2020-05-29

## 2020-05-25 RX ORDER — CLOZAPINE 150 MG/1
25 TABLET, ORALLY DISINTEGRATING ORAL
Refills: 0 | Status: DISCONTINUED | OUTPATIENT
Start: 2020-05-25 | End: 2020-05-28

## 2020-05-25 RX ORDER — CLOZAPINE 150 MG/1
25 TABLET, ORALLY DISINTEGRATING ORAL
Refills: 0 | Status: DISCONTINUED | OUTPATIENT
Start: 2020-05-25 | End: 2020-05-25

## 2020-05-25 RX ORDER — CHOLECALCIFEROL (VITAMIN D3) 125 MCG
1 CAPSULE ORAL
Qty: 0 | Refills: 0 | DISCHARGE

## 2020-05-25 RX ORDER — LACTULOSE 10 G/15ML
20 SOLUTION ORAL DAILY
Refills: 0 | Status: DISCONTINUED | OUTPATIENT
Start: 2020-05-25 | End: 2020-05-29

## 2020-05-25 RX ORDER — ACETAMINOPHEN 500 MG
650 TABLET ORAL EVERY 8 HOURS
Refills: 0 | Status: DISCONTINUED | OUTPATIENT
Start: 2020-05-25 | End: 2020-05-29

## 2020-05-25 RX ORDER — TAMSULOSIN HYDROCHLORIDE 0.4 MG/1
0.4 CAPSULE ORAL AT BEDTIME
Refills: 0 | Status: DISCONTINUED | OUTPATIENT
Start: 2020-05-25 | End: 2020-05-29

## 2020-05-25 RX ORDER — CLONAZEPAM 1 MG
0.5 TABLET ORAL
Refills: 0 | Status: DISCONTINUED | OUTPATIENT
Start: 2020-05-25 | End: 2020-05-29

## 2020-05-25 RX ORDER — ACETAMINOPHEN 500 MG
2 TABLET ORAL
Qty: 0 | Refills: 0 | DISCHARGE

## 2020-05-25 RX ORDER — LEVOTHYROXINE SODIUM 125 MCG
1 TABLET ORAL
Qty: 0 | Refills: 0 | DISCHARGE

## 2020-05-25 RX ORDER — CEFTRIAXONE 500 MG/1
1000 INJECTION, POWDER, FOR SOLUTION INTRAMUSCULAR; INTRAVENOUS EVERY 24 HOURS
Refills: 0 | Status: DISCONTINUED | OUTPATIENT
Start: 2020-05-25 | End: 2020-05-26

## 2020-05-25 RX ORDER — TAMSULOSIN HYDROCHLORIDE 0.4 MG/1
1 CAPSULE ORAL
Qty: 0 | Refills: 0 | DISCHARGE

## 2020-05-25 RX ORDER — CLOZAPINE 150 MG/1
25 TABLET, ORALLY DISINTEGRATING ORAL ONCE
Refills: 0 | Status: DISCONTINUED | OUTPATIENT
Start: 2020-05-25 | End: 2020-05-25

## 2020-05-25 RX ADMIN — Medication 0.5 MILLIGRAM(S): at 16:47

## 2020-05-25 RX ADMIN — CEFTRIAXONE 100 MILLIGRAM(S): 500 INJECTION, POWDER, FOR SOLUTION INTRAMUSCULAR; INTRAVENOUS at 21:49

## 2020-05-25 RX ADMIN — Medication 0.5 MILLIGRAM(S): at 05:20

## 2020-05-25 RX ADMIN — LACTULOSE 20 GRAM(S): 10 SOLUTION ORAL at 16:56

## 2020-05-25 RX ADMIN — ENOXAPARIN SODIUM 40 MILLIGRAM(S): 100 INJECTION SUBCUTANEOUS at 12:38

## 2020-05-25 RX ADMIN — CLOZAPINE 25 MILLIGRAM(S): 150 TABLET, ORALLY DISINTEGRATING ORAL at 16:56

## 2020-05-25 RX ADMIN — Medication 0.5 MILLIGRAM(S): at 08:39

## 2020-05-25 RX ADMIN — TAMSULOSIN HYDROCHLORIDE 0.4 MILLIGRAM(S): 0.4 CAPSULE ORAL at 21:49

## 2020-05-25 RX ADMIN — Medication 1000 UNIT(S): at 12:38

## 2020-05-25 RX ADMIN — Medication 75 MICROGRAM(S): at 06:43

## 2020-05-25 RX ADMIN — Medication 5 MILLIGRAM(S): at 21:49

## 2020-05-25 RX ADMIN — CLOZAPINE 25 MILLIGRAM(S): 150 TABLET, ORALLY DISINTEGRATING ORAL at 12:38

## 2020-05-25 NOTE — H&P ADULT - ATTENDING COMMENTS
63yo F from [facility], walking with walker with PMH of schizoaffective disorder, HTN, hypothyroidism, neurologic bladder, anemia, GERD, constipation, cervical myelopathy was sent to ED for AMS. Pt was noted to have fever 102F and refused all PM meds. No chest pain, no shortness of breath, no reported vomiting.    # SUSPECTED UTI - IV ROCEPHIN; F/U UCX  # DECOMPENSATED SCHIZOAFFECTIVE DISORDER - CONTINUE CURRENT REGIMEN - CLOZAPINE; PSYCHIATRY CONSULT PLACED  # HTN  # GERD  # GI AND DVT PPX    ALONSO ELLSWORTH M.D. COVERING FOR BRAN ELLSWORTH M.D.

## 2020-05-25 NOTE — H&P ADULT - PROBLEM SELECTOR PLAN 3
- pt is taking clonazepam 0.5mg BID, clozapine 25mg BID, no antipsychotic meds  - c/w home meds - pt is taking clonazepam 0.5mg BID, clozapine 25mg BID, no antipsychotic meds  - c/w home meds  - pt stating "I don't take shower because angels on my arms don't like it."  " Someone took my angels." as per PCP Dr. Pérez, pt is not like this usually   - left message to psych consult (2220)

## 2020-05-25 NOTE — H&P ADULT - PROBLEM SELECTOR PLAN 5
IMPROVE VTE Individual Risk Assessment  RISK                                                                Points  [  ] Previous VTE                                                  3  [  ] Thrombophilia                                               2  [  ] Lower limb paralysis                                      2        (unable to hold up >15 seconds)    [  ] Current Cancer                                              2         (within 6 months)  [x ] Immobilization > 24 hrs                                1  [  ] ICU/CCU stay > 24 hours                              1  [ x ] Age > 60                                                      1  IMPROVE VTE Score __2______  Lovenox 40mg daily for DVT ppx

## 2020-05-25 NOTE — H&P ADULT - NSHPREVIEWOFSYSTEMS_GEN_ALL_CORE
REVIEW OF SYSTEMS:    CONSTITUTIONAL: No weakness, fevers or chills  EYES/ENT: No visual changes;  No vertigo or throat pain   NECK: No pain or stiffness  RESPIRATORY: No cough, wheezing, hemoptysis; No shortness of breath  CARDIOVASCULAR: No chest pain or palpitations  GASTROINTESTINAL: (+) mild lower abdominal pain. No nausea, vomiting, or hematemesis; No diarrhea or constipation. No melena or hematochezia.  GENITOURINARY: No dysuria, frequency or hematuria  NEUROLOGICAL: No numbness or weakness  SKIN: No itching, rashes

## 2020-05-25 NOTE — H&P ADULT - NSHPPHYSICALEXAM_GEN_ALL_CORE
Vital Signs Last 24 Hrs  T(C): 36.4 (25 May 2020 06:43), Max: 36.9 (24 May 2020 21:28)  T(F): 97.5 (25 May 2020 06:43), Max: 98.5 (24 May 2020 21:28)  HR: 77 (25 May 2020 06:43) (77 - 80)  BP: 117/72 (25 May 2020 06:43) (111/73 - 117/72)  BP(mean): --  RR: 18 (25 May 2020 06:43) (16 - 18)  SpO2: 95% (25 May 2020 06:43) (95% - 97%)      PHYSICAL EXAM:  GENERAL: NAD, well-groomed, well-developed  HEAD:  Atraumatic, Normocephalic  EYES:  PERRLA, conjunctiva and sclera clear refused eye neuro exam  ENMT: No tonsillar erythema, exudates, or enlargement; Moist mucous membranes, No lesions  NECK: Supple, No JVD, Normal thyroid  NERVOUS SYSTEM:  Alert & Oriented X2,  Motor Strength 5/5 B/L upper and lower extremities;  CHEST/LUNG: Refused lung exam states "My lungs are ok"  HEART: Regular rate and rhythm; No murmurs, rubs, or gallops  ABDOMEN: Soft, (+)tender in middle lower abd, Nondistended; Bowel sounds present  EXTREMITIES:  2+ Peripheral Pulses, No clubbing, cyanosis, or edema  LYMPH: No lymphadenopathy noted  SKIN: No rashes or lesions

## 2020-05-25 NOTE — H&P ADULT - PROBLEM SELECTOR PLAN 1
sent by AL for AMS, noted to have fever in AL,  - currently A&O x2  -CTH was negative for acute encephalopathy, NIHSS score cannot be assess as pt was not cooperative  - CXR clear   - UA -ve  - encourage good sleep hygiene, adequate lighting.   - Avoid use of anticholinergic, benzodiazepine and opioid medications  - Maintain daytime awakeness / night-time sleep  - f/u syphilis serology, SPEP; B12, folate and vit D levels.   - f/u Ucx and BCx  -f/u neurology consult - Dr lutz sent by AL for AMS, noted to have fever in AL,  - currently A&O x2  -CTH was negative for acute encephalopathy, NIHSS score cannot be assess as pt was not cooperative  - CXR clear   - UA -ve  - encourage good sleep hygiene, adequate lighting.   - Avoid use of anticholinergic, benzodiazepine and opioid medications  - Maintain daytime awakeness / night-time sleep  - f/u syphilis serology, SPEP; B12, folate and vit D levels.   - f/u Ucx and BCx  - f/u neurology consult - Dr lutz sent by AL for AMS, noted to have fever in AL,  - currently A&O x2  -CTH was negative for acute encephalopathy, NIHSS score cannot be assess as pt was not cooperative  - CXR clear   - UA -ve  - encourage good sleep hygiene, adequate lighting.   - Avoid use of anticholinergic, benzodiazepine and opioid medications  - Maintain daytime awakeness / night-time sleep  - f/u syphilis serology, SPEP; B12, folate and vit D levels.   - f/u Ucx and BCx  - f/u Xray abd to assess constipation and abd etiology   Psychiatry was consulted (left message to [id number])

## 2020-05-25 NOTE — H&P ADULT - NSHPSOCIALHISTORY_GEN_ALL_CORE
Pt lives in [facility]. Pt lives in [facility].  Pt denied smoking and EtOH taking. Pt stateds she quit smoking weed yrs ago.

## 2020-05-25 NOTE — H&P ADULT - HISTORY OF PRESENT ILLNESS
65yo F from [facility], walking with walker with PMH of schizoaffective disorder, HTN, hypothyroidism, neurologic bladder, anemia, GERD, constipation, cervical myelopathy was sent to ED for AMS. Pt was noted to have fever 102F and refused all PM meds. No chest pain, no shortness of breath, no reported vomiting.    In ED, pt is afebrile, pt is A&O x2,  oriented to self and her , and place, but she thinks it's September.   CTH: no acute pathology, EKG: NSR, QTc 426  CXR: clear, UA -ve, covid is pending  Pt kept saying " I need treatment for schizophrenia, this is not a right place." and refused neuro exam. 65yo F from [facility], walking with walker with PMH of schizoaffective disorder, HTN, hypothyroidism, neurologic bladder, anemia, GERD, constipation, cervical myelopathy was sent to ED for AMS. Pt was noted to have fever 102F and refused all PM meds. No chest pain, no shortness of breath, no reported vomiting.    In ED, pt is afebrile, pt is A&O x2,  oriented to self and her , and place, but she thinks it's September.   CTH: no acute pathology, EKG: NSR, QTc 426  CXR: clear, UA -ve, covid is pending  Pt kept saying " I need a treatment for schizophrenia, I'm taking 5 pills for that." But pt can't give the names if medications.   Also states " I want to go back to assisted living."

## 2020-05-25 NOTE — H&P ADULT - ASSESSMENT
65yo F from [facility], walking with walker with PMH of schizoaffective disorder, HTN, hypothyroidism, neurologic bladder, anemia, GERD, constipation, cervical myelopathy was sent to ED for AMS. Pt was noted to have fever 102F and refused all PM meds. No chest pain, no shortness of breath, no reported vomiting.    In ED, pt is afebrile, pt is A&O x2,  oriented to self and her , and place, but she thinks it's September.   CTH: no acute pathology, EKG: NSR, QTc 426  CXR: clear, UA -ve, covid is pending  Pt kept saying " I need a treatment for schizophrenia, I'm taking 5 pills for that." But pt can't give the names if medications.   Also states " I want to go back tp assissted living, the nursed there is 10 times better than here." 63yo F from [facility], walking with walker with PMH of schizoaffective disorder, HTN, hypothyroidism, neurologic bladder, anemia, GERD, constipation, cervical myelopathy was sent to ED for AMS. Pt was noted to have fever 102F and refused all PM meds.   In ED, pt is afebrile, pt is A&O x2,  oriented to self and her , and place, but she thinks it's September.   CTH: no acute pathology, EKG: NSR, QTc 426  CXR: clear, UA -ve, covid is pending  Pt kept saying " I need a treatment for schizophrenia, I'm taking 5 pills for that." But pt can't give the names if medications.   Also states " I want to go back to assisted living."

## 2020-05-26 ENCOUNTER — TRANSCRIPTION ENCOUNTER (OUTPATIENT)
Age: 65
End: 2020-05-26

## 2020-05-26 DIAGNOSIS — F05 DELIRIUM DUE TO KNOWN PHYSIOLOGICAL CONDITION: ICD-10-CM

## 2020-05-26 DIAGNOSIS — F25.1 SCHIZOAFFECTIVE DISORDER, DEPRESSIVE TYPE: ICD-10-CM

## 2020-05-26 LAB
ALBUMIN SERPL ELPH-MCNC: 3 G/DL — LOW (ref 3.5–5)
ALP SERPL-CCNC: 77 U/L — SIGNIFICANT CHANGE UP (ref 40–120)
ALT FLD-CCNC: 13 U/L DA — SIGNIFICANT CHANGE UP (ref 10–60)
AMMONIA BLD-MCNC: 34 UMOL/L — HIGH (ref 11–32)
ANION GAP SERPL CALC-SCNC: 7 MMOL/L — SIGNIFICANT CHANGE UP (ref 5–17)
AST SERPL-CCNC: 12 U/L — SIGNIFICANT CHANGE UP (ref 10–40)
BASOPHILS # BLD AUTO: 0.01 K/UL — SIGNIFICANT CHANGE UP (ref 0–0.2)
BASOPHILS NFR BLD AUTO: 0.2 % — SIGNIFICANT CHANGE UP (ref 0–2)
BILIRUB SERPL-MCNC: 0.3 MG/DL — SIGNIFICANT CHANGE UP (ref 0.2–1.2)
BUN SERPL-MCNC: 11 MG/DL — SIGNIFICANT CHANGE UP (ref 7–18)
CALCIUM SERPL-MCNC: 9.3 MG/DL — SIGNIFICANT CHANGE UP (ref 8.4–10.5)
CHLORIDE SERPL-SCNC: 109 MMOL/L — HIGH (ref 96–108)
CO2 SERPL-SCNC: 26 MMOL/L — SIGNIFICANT CHANGE UP (ref 22–31)
CREAT SERPL-MCNC: 0.71 MG/DL — SIGNIFICANT CHANGE UP (ref 0.5–1.3)
CULTURE RESULTS: NO GROWTH — SIGNIFICANT CHANGE UP
EOSINOPHIL # BLD AUTO: 0 K/UL — SIGNIFICANT CHANGE UP (ref 0–0.5)
EOSINOPHIL NFR BLD AUTO: 0 % — SIGNIFICANT CHANGE UP (ref 0–6)
ERYTHROCYTE [SEDIMENTATION RATE] IN BLOOD: 18 MM/HR — SIGNIFICANT CHANGE UP (ref 0–20)
GLUCOSE SERPL-MCNC: 99 MG/DL — SIGNIFICANT CHANGE UP (ref 70–99)
HCT VFR BLD CALC: 36.4 % — SIGNIFICANT CHANGE UP (ref 34.5–45)
HCV AB S/CO SERPL IA: 0.21 S/CO — SIGNIFICANT CHANGE UP (ref 0–0.99)
HCV AB SERPL-IMP: SIGNIFICANT CHANGE UP
HGB BLD-MCNC: 11.5 G/DL — SIGNIFICANT CHANGE UP (ref 11.5–15.5)
IMM GRANULOCYTES NFR BLD AUTO: 0.2 % — SIGNIFICANT CHANGE UP (ref 0–1.5)
LYMPHOCYTES # BLD AUTO: 2.19 K/UL — SIGNIFICANT CHANGE UP (ref 1–3.3)
LYMPHOCYTES # BLD AUTO: 38.7 % — SIGNIFICANT CHANGE UP (ref 13–44)
MAGNESIUM SERPL-MCNC: 2.4 MG/DL — SIGNIFICANT CHANGE UP (ref 1.6–2.6)
MCHC RBC-ENTMCNC: 30.4 PG — SIGNIFICANT CHANGE UP (ref 27–34)
MCHC RBC-ENTMCNC: 31.6 GM/DL — LOW (ref 32–36)
MCV RBC AUTO: 96.3 FL — SIGNIFICANT CHANGE UP (ref 80–100)
MONOCYTES # BLD AUTO: 0.54 K/UL — SIGNIFICANT CHANGE UP (ref 0–0.9)
MONOCYTES NFR BLD AUTO: 9.5 % — SIGNIFICANT CHANGE UP (ref 2–14)
NEUTROPHILS # BLD AUTO: 2.91 K/UL — SIGNIFICANT CHANGE UP (ref 1.8–7.4)
NEUTROPHILS NFR BLD AUTO: 51.4 % — SIGNIFICANT CHANGE UP (ref 43–77)
NRBC # BLD: 0 /100 WBCS — SIGNIFICANT CHANGE UP (ref 0–0)
PHOSPHATE SERPL-MCNC: 4.7 MG/DL — HIGH (ref 2.5–4.5)
PLATELET # BLD AUTO: 261 K/UL — SIGNIFICANT CHANGE UP (ref 150–400)
POTASSIUM SERPL-MCNC: 3.6 MMOL/L — SIGNIFICANT CHANGE UP (ref 3.5–5.3)
POTASSIUM SERPL-SCNC: 3.6 MMOL/L — SIGNIFICANT CHANGE UP (ref 3.5–5.3)
PROT SERPL-MCNC: 6.5 G/DL — SIGNIFICANT CHANGE UP (ref 6–8.3)
RBC # BLD: 3.78 M/UL — LOW (ref 3.8–5.2)
RBC # FLD: 15.2 % — HIGH (ref 10.3–14.5)
SODIUM SERPL-SCNC: 142 MMOL/L — SIGNIFICANT CHANGE UP (ref 135–145)
SPECIMEN SOURCE: SIGNIFICANT CHANGE UP
WBC # BLD: 5.71 K/UL — SIGNIFICANT CHANGE UP (ref 3.8–10.5)
WBC # FLD AUTO: 5.71 K/UL — SIGNIFICANT CHANGE UP (ref 3.8–10.5)

## 2020-05-26 PROCEDURE — 99223 1ST HOSP IP/OBS HIGH 75: CPT | Mod: 95

## 2020-05-26 RX ORDER — ZOLPIDEM TARTRATE 10 MG/1
5 TABLET ORAL ONCE
Refills: 0 | Status: DISCONTINUED | OUTPATIENT
Start: 2020-05-26 | End: 2020-05-26

## 2020-05-26 RX ORDER — OLANZAPINE 15 MG/1
5 TABLET, FILM COATED ORAL
Refills: 0 | Status: DISCONTINUED | OUTPATIENT
Start: 2020-05-26 | End: 2020-05-26

## 2020-05-26 RX ADMIN — TAMSULOSIN HYDROCHLORIDE 0.4 MILLIGRAM(S): 0.4 CAPSULE ORAL at 20:30

## 2020-05-26 RX ADMIN — ENOXAPARIN SODIUM 40 MILLIGRAM(S): 100 INJECTION SUBCUTANEOUS at 11:15

## 2020-05-26 RX ADMIN — Medication 0.5 MILLIGRAM(S): at 17:23

## 2020-05-26 RX ADMIN — Medication 75 MICROGRAM(S): at 05:21

## 2020-05-26 RX ADMIN — Medication 0.5 MILLIGRAM(S): at 08:10

## 2020-05-26 RX ADMIN — CLOZAPINE 25 MILLIGRAM(S): 150 TABLET, ORALLY DISINTEGRATING ORAL at 10:05

## 2020-05-26 RX ADMIN — LACTULOSE 20 GRAM(S): 10 SOLUTION ORAL at 11:15

## 2020-05-26 RX ADMIN — CLOZAPINE 25 MILLIGRAM(S): 150 TABLET, ORALLY DISINTEGRATING ORAL at 17:23

## 2020-05-26 RX ADMIN — Medication 1000 UNIT(S): at 11:15

## 2020-05-26 RX ADMIN — Medication 50 MILLIGRAM(S): at 05:21

## 2020-05-26 RX ADMIN — ZOLPIDEM TARTRATE 5 MILLIGRAM(S): 10 TABLET ORAL at 21:22

## 2020-05-26 RX ADMIN — Medication 5 MILLIGRAM(S): at 23:44

## 2020-05-26 NOTE — BEHAVIORAL HEALTH ASSESSMENT NOTE - OTHER PAST PSYCHIATRIC HISTORY (INCLUDE DETAILS REGARDING ONSET, COURSE OF ILLNESS, INPATIENT/OUTPATIENT TREATMENT)
PHx: SAD  IP: 16 admissions, most recent @[facility] psych in 10/2019  OP: F/w [facility] staff psychiatrist Shahzad Victor MD  Rx: Clozapine (confirmed active in REMS registry, but dose unknown), Klonopin 0.5 mg BID, Zyprexa (? previously reported as 5 mg BID), Aristada [aripiprazole MCKEON] 882 mg q4wk PHx: SAD  IP: 16 admissions, most recent @[facility] psych in 10/2019  OP: F/w [facility] staff [facility] Shahzad Victor MD  Rx (per [facility] records): Clozapine (confirmed active in REMS registry) 25 mg BID, Klonopin 0.5 mg BID, Zyprexa (? previously reported as 5 mg BID), Aristada [aripiprazole MCKEON] 882 mg q4wk

## 2020-05-26 NOTE — DISCHARGE NOTE PROVIDER - HOSPITAL COURSE
65yo F from [facility], walking with walker with PMH of schizoaffective disorder, HTN, hypothyroidism, neurologic bladder, anemia, GERD, constipation, cervical myelopathy was sent to ED for AMS. Pt was noted to have fever 102F and refused all PM meds. No chest pain, no shortness of breath, no reported vomiting.        In ED, pt is afebrile, pt is A&O x2,  oriented to self and her , and place, but she thinks it's September.     CTH: no acute pathology, EKG: NSR, QTc 426    CXR: clear, UA -ve, covid is pending    Pt kept saying " I need a treatment for schizophrenia, I'm taking 5 pills for that." But pt can't give the names if medications.     Also states " I want to go back to assisted living."         Schizoaffective Disorder        Patient presented with altered mental status. CT Head was negative for acute intracranial mass or pathology. NIHSS was not assessed due to altered mental status. Chest XR was performed and was found to be negative. Urine analysis was negative along with urine culture and blood culture. Additional studies for syphillis, Vitamin B12, and Folate were found     to be normal. Xray of the abdomen was performed and was negative for constipation. Psychiatry consulted the patient and resumed the patients Klonopin and Clozapine following consultation from her provider at [place].         Hypertension        Patient was noted to have a chronic history of hypertension and was continued on Metoprolol XL 50 mg daily with proper blood pressure control.         Hypothyroidism        Patient was found to have proper thyroid function during the inpatient admission and was provided 75 micrograms daily with no adverse effects. Patient was instructed to continue taking her home dose of Levothyroxine and follow up with her Primary Care Provider in 2-3 weeks.         Vitamin D Deficiency        You were found to have Vitamin D Deficiency and was placed on cholecalciferol 1000 U to be taken daily. Patient was instructed to follow up with her primary care provider in 3-4 for proper correction.

## 2020-05-26 NOTE — DISCHARGE NOTE PROVIDER - NSDCCPCAREPLAN_GEN_ALL_CORE_FT
PRINCIPAL DISCHARGE DIAGNOSIS  Diagnosis: Schizoaffective disorder  Assessment and Plan of Treatment: Patient presented with altered mental status. CT Head was negative for acute intracranial mass or pathology. NIHSS was not assessed due to altered mental status. Chest XR was performed and was found to be negative. Urine analysis was negative along with urine culture and blood culture. Additional studies for syphillis, Vitamin B12, and Folate were found   to be normal. Xray of the abdomen was performed and was negative for constipation. Psychiatry consulted the patient and resumed the patients Klonopin and Clozapine following consultation from her provider at [place].      SECONDARY DISCHARGE DIAGNOSES  Diagnosis: Hypertension  Assessment and Plan of Treatment: Patient was noted to have a chronic history of hypertension and was continued on Metoprolol XL 50 mg daily with proper blood pressure control.    Diagnosis: Vitamin D deficiency  Assessment and Plan of Treatment: You were found to have You were found to have Vitamin D Deficiency and was placed on Ergocalciferol 50,000 U to be taken weekly for 8 weeks and to follow up with your PCP for additional health care guidance for additional maintenance. and was placed on cholecalciferol 1000 U to be taken daily. Patient was instructed to follow up with her primary care provider in 3-4 for proper correction.    Diagnosis: Hypothyroidism  Assessment and Plan of Treatment: Patient was found to have proper thyroid function during the inpatient admission and was provided 75 micrograms daily with no adverse effects. Patient was instructed to continue taking her home dose of Levothyroxine and follow up with her Primary Care Provider in 2-3 weeks. PRINCIPAL DISCHARGE DIAGNOSIS  Diagnosis: Schizoaffective disorder  Assessment and Plan of Treatment: Patient presented with altered mental status. CT Head was negative for acute intracranial mass or pathology. NIHSS was not assessed due to altered mental status. Chest XR was performed and was found to be negative. Urine analysis was negative along with urine culture and blood culture. Additional studies for syphillis, Vitamin B12, and Folate were found to be normal. Xray of the abdomen was performed and was negative for constipation. Psychiatry consulted the patient and resumed the patients Klonopin and Clozapine following consultation from her provider at [place].      SECONDARY DISCHARGE DIAGNOSES  Diagnosis: Hypertension  Assessment and Plan of Treatment: Patient was noted to have a chronic history of hypertension and was continued on Metoprolol XL 50 mg daily with proper blood pressure control.    Diagnosis: Vitamin D deficiency  Assessment and Plan of Treatment: You were found to have You were found to have Vitamin D Deficiency and was placed on Ergocalciferol 50,000 U to be taken weekly for 8 weeks and to follow up with your PCP for additional health care guidance for additional maintenance. and was placed on cholecalciferol 1000 U to be taken daily. Patient was instructed to follow up with her primary care provider in 3-4 for proper correction.    Diagnosis: Hypothyroidism  Assessment and Plan of Treatment: Patient was found to have proper thyroid function during the inpatient admission and was provided 75 micrograms daily with no adverse effects. Patient was instructed to continue taking her home dose of Levothyroxine and follow up with her Primary Care Provider in 2-3 weeks. PRINCIPAL DISCHARGE DIAGNOSIS  Diagnosis: Schizoaffective disorder  Assessment and Plan of Treatment: Patient presented with altered mental status. CT Head was negative for acute intracranial mass or pathology. NIHSS was not assessed due to altered mental status. Chest XR was performed and was found to be negative. Urine analysis was negative along with urine culture and blood culture. Additional studies for syphillis, Vitamin B12, and Folate were found to be normal. Xray of the abdomen was performed and was negative for constipation. Psychiatry consulted the patient and resumed the patients Klonopin and Clozapine following consultation from her provider at [place].   patient tested Negtive for COVID-19.      SECONDARY DISCHARGE DIAGNOSES  Diagnosis: Hypertension  Assessment and Plan of Treatment: Patient was noted to have a chronic history of hypertension and was continued on Metoprolol XL 50 mg daily with proper blood pressure control.    Diagnosis: Vitamin D deficiency  Assessment and Plan of Treatment: You were found to have You were found to have Vitamin D Deficiency and was placed on Ergocalciferol 50,000 U to be taken weekly for 8 weeks and to follow up with your PCP for additional health care guidance for additional maintenance. and was placed on cholecalciferol 1000 U to be taken daily. Patient was instructed to follow up with her primary care provider in 3-4 for proper correction.    Diagnosis: Hypothyroidism  Assessment and Plan of Treatment: Patient was found to have proper thyroid function during the inpatient admission and was provided 75 micrograms daily with no adverse effects. Patient was instructed to continue taking her home dose of Levothyroxine and follow up with her Primary Care Provider in 2-3 weeks. PRINCIPAL DISCHARGE DIAGNOSIS  Diagnosis: Schizoaffective disorder  Assessment and Plan of Treatment: Patient presented with altered mental status. CT Head was negative for acute intracranial mass or pathology. NIHSS was not assessed due to altered mental status. Chest XR was performed and was found to be negative. Urine analysis was negative along with urine culture and blood culture. Additional studies for syphillis, Vitamin B12, and Folate were found to be normal. Xray of the abdomen was performed and was negative for constipation. Psychiatry consulted the patient and resumed the patients Klonopin and Clozapine following consultation from her provider at [place].   patient tested Negtive for COVID-19 twice 5/24 and 5/28      SECONDARY DISCHARGE DIAGNOSES  Diagnosis: Hypertension  Assessment and Plan of Treatment: Patient was noted to have a chronic history of hypertension and was continued on Metoprolol XL 50 mg daily with proper blood pressure control.    Diagnosis: Vitamin D deficiency  Assessment and Plan of Treatment: You were found to have You were found to have Vitamin D Deficiency and was placed on Ergocalciferol 50,000 U to be taken weekly for 8 weeks and to follow up with your PCP for additional health care guidance for additional maintenance. and was placed on cholecalciferol 1000 U to be taken daily. Patient was instructed to follow up with her primary care provider in 3-4 for proper correction.    Diagnosis: Hypothyroidism  Assessment and Plan of Treatment: Patient was found to have proper thyroid function during the inpatient admission and was provided 75 micrograms daily with no adverse effects. Patient was instructed to continue taking her home dose of Levothyroxine and follow up with her Primary Care Provider in 2-3 weeks.

## 2020-05-26 NOTE — PROGRESS NOTE ADULT - SUBJECTIVE AND OBJECTIVE BOX
PGY1 Note discussed with supervising resident and primary attending.    Patient is a 64y old  Female who presents with a chief complaint of AMS (25 May 2020 05:49)    INTERVAL HPI/OVERNIGHT EVENTS:  - No acute events overnight  - Hemodynamically stable and afebrile  - Tolerating a normal diet  - Disoriented and unable to communicate  - Evaluated by Psychiatry    MEDICATIONS  (STANDING):  bisacodyl 5 milliGRAM(s) Oral at bedtime  cefTRIAXone   IVPB 1000 milliGRAM(s) IV Intermittent every 24 hours  cholecalciferol 1000 Unit(s) Oral daily  clonazePAM  Tablet 0.5 milliGRAM(s) Oral <User Schedule>  cloZAPine 25 milliGRAM(s) Oral <User Schedule>  enoxaparin Injectable 40 milliGRAM(s) SubCutaneous daily  lactulose Syrup 20 Gram(s) Oral daily  levothyroxine 75 MICROGram(s) Oral daily  metoprolol succinate ER 50 milliGRAM(s) Oral daily  tamsulosin 0.4 milliGRAM(s) Oral at bedtime    MEDICATIONS  (PRN):  acetaminophen   Tablet .. 650 milliGRAM(s) Oral every 8 hours PRN Temp greater or equal to 38C (100.4F)    Allergies    Hair dye (Unknown)  No Known Drug Allergies    Intolerances    REVIEW OF SYSTEMS: Unable to evaluate due to mental impairment     Vital Signs Last 24 Hrs  T(C): 36.6 (26 May 2020 11:04), Max: 37.2 (25 May 2020 23:19)  T(F): 97.8 (26 May 2020 11:04), Max: 99 (25 May 2020 23:19)  HR: 78 (26 May 2020 11:06) (65 - 84)  BP: 97/61 (26 May 2020 11:04) (97/54 - 139/66)  BP(mean): 75 (25 May 2020 16:00) (75 - 75)  RR: 18 (26 May 2020 11:04) (17 - 18)  SpO2: 96% (26 May 2020 11:06) (96% - 100%)    PHYSICAL EXAM:  GENERAL: NAD, well-groomed, well-developed  HEAD:  Atraumatic, Normocephalic  EYES: EOMI, PERRLA, conjunctiva and sclera clear  NECK: Supple, No JVD, Normal thyroid  CHEST/LUNG: Clear to percussion bilaterally; No rales, rhonchi, wheezing, or rubs  HEART: Regular rate and rhythm; No murmurs, rubs, or gallops  ABDOMEN: Soft, Nontender, Nondistended; Bowel sounds present  NERVOUS SYSTEM:  Alert & Oriented X3, Good concentration; Motor Strength 5/5 B/L   EXTREMITIES:  2+ Peripheral Pulses, No clubbing, cyanosis, or edema  SKIN: Warm, Dry    LABS:                        11.5   5.71  )-----------( 261      ( 26 May 2020 07:16 )             36.4     05-    142  |  109<H>  |  11  ----------------------------<  99  3.6   |  26  |  0.71    Ca    9.3      26 May 2020 07:16  Phos  4.7       Mg     2.4         TPro  6.5  /  Alb  3.0<L>  /  TBili  0.3  /  DBili  x   /  AST  12  /  ALT  13  /  AlkPhos  77  -    PT/INR - ( 24 May 2020 22:48 )   PT: 11.6 sec;   INR: 1.03 ratio       PTT - ( 24 May 2020 22:48 )  PTT:43.5 sec  Urinalysis Basic - ( 25 May 2020 00:21 )    Color: Yellow / Appearance: Clear / S.025 / pH: x  Gluc: x / Ketone: Negative  / Bili: Negative / Urobili: Negative   Blood: x / Protein: Negative / Nitrite: Negative   Leuk Esterase: Negative / RBC: 2-5 /HPF / WBC 0-2 /HPF   Sq Epi: x / Non Sq Epi: Few /HPF / Bacteria: Few /HPF    CAPILLARY BLOOD GLUCOSE    RADIOLOGY & ADDITIONAL TESTS:    Imaging Personally Reviewed:  [ ] YES  [ ] NO    Consultant(s) Notes Reviewed:  [ ] YES  [ ] NO

## 2020-05-26 NOTE — PROGRESS NOTE ADULT - PROBLEM SELECTOR PLAN 3
- pt is taking clonazepam 0.5mg BID, clozapine 25mg BID, no antipsychotic meds  - c/w home meds  - pt stating "I don't take shower because angels on my arms don't like it."  " Someone took my angels." as per PCP Dr. Pérez, pt is not like this usually   - left message to psych consult (2220)  - f/u Psychiatry Consult [ ]

## 2020-05-26 NOTE — BEHAVIORAL HEALTH ASSESSMENT NOTE - NSBHCHARTREVIEWVS_PSY_A_CORE FT
Vital Signs Last 24 Hrs  T(C): 36.7 (26 May 2020 20:30), Max: 37.2 (25 May 2020 23:19)  T(F): 98 (26 May 2020 20:30), Max: 99 (25 May 2020 23:19)  HR: 83 (26 May 2020 20:30) (65 - 97)  BP: 106/52 (26 May 2020 20:30) (97/54 - 139/66)  BP(mean): --  RR: 17 (26 May 2020 20:30) (17 - 18)  SpO2: 100% (26 May 2020 20:30) (96% - 100%)

## 2020-05-26 NOTE — BEHAVIORAL HEALTH ASSESSMENT NOTE - NSBHCONSULTRECOMMENDOTHER_PSY_A_CORE FT
1. C/w verified home medications: Klonopin 0.5 mg BID, clozapine 25 mg qhs  2. Call placed to pt's OP psychiatrist Dr. Victor to verify other medications, awaiting response  3. Medical management as directed by primary team  4. Pt is psych cleared for dispo as soon as she is medically optimized  5. Psychiatry will continue to follow while admitted  6. Case d/w Dr. DRISS Pérez of primary team    Inessa Jones MD  Director, Consultation-Liaison Psychiatry Service  [id number] 1. C/w verified home medications: Klonopin 0.5 mg BID, clozapine 25 mg BID  2. Call placed to pt's OP psychiatrist Dr. Victor to verify other medications, awaiting response  3. Medical management as directed by primary team  4. Pt is psych cleared for dispo as soon as she is medically optimized  5. Psychiatry will continue to follow while admitted  6. Case d/w Dr. DRISS Pérez of primary team    Inessa Jones MD  Director, Consultation-Liaison Psychiatry Service  [id number]

## 2020-05-26 NOTE — PROGRESS NOTE ADULT - ASSESSMENT
63yo F from [facility], walking with walker with PMH of schizoaffective disorder, HTN, hypothyroidism, neurologic bladder, anemia, GERD, constipation, cervical myelopathy was sent to ED for AMS. Pt was noted to have fever 102F and refused all PM meds.   In ED, pt is afebrile, pt is A&O x2,  oriented to self and her , and place, but she thinks it's September.   CTH: no acute pathology, EKG: NSR, QTc 426  CXR: clear, UA -ve, covid is pending  Pt kept saying " I need a treatment for schizophrenia, I'm taking 5 pills for that." But pt can't give the names if medications.   Also states " I want to go back to assisted living."

## 2020-05-26 NOTE — PHYSICAL THERAPY INITIAL EVALUATION ADULT - CRITERIA FOR SKILLED THERAPEUTIC INTERVENTIONS
anticipated discharge recommendation/rehab potential/predicted duration of therapy intervention/therapy frequency/impairments found

## 2020-05-26 NOTE — BEHAVIORAL HEALTH ASSESSMENT NOTE - NSBHSOCIALHXDETAILSFT_PSY_A_CORE
B/R in Jacob Castellanos MD. No longer in touch with family. Has lived at [facility] for past 20 yrs. Never , no children. Has several friends she speaks to on the phone.

## 2020-05-26 NOTE — BEHAVIORAL HEALTH ASSESSMENT NOTE - SUICIDE PROTECTIVE FACTORS
Responsibility to family and others/Has future plans/Supportive social network of family or friends/Positive therapeutic relationships/Identifies reasons for living

## 2020-05-26 NOTE — BEHAVIORAL HEALTH ASSESSMENT NOTE - HPI (INCLUDE ILLNESS QUALITY, SEVERITY, DURATION, TIMING, CONTEXT, MODIFYING FACTORS, ASSOCIATED SIGNS AND SYMPTOMS)
64F, single and disabled living at [facility] for past 20 yrs, with PHx of schizoaffective DO f/w [facility] psychiatrist Dr. Victor, on multiple medications including clozapine, and MHx of HTN, hypothyroidism, neurogenic bladder, anemia, GERD, constipation, cervical myelopathy, BIBEMS from [facility] for AMS and found to have fever 102F. Pt admitted for management of suspected infection. Psych consult was called for assistance with med management, given pt's reported PHx. Pt seen for interview in her room via telepsych, encountered lying in bed awake, alert and cooperative. 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 Pt repeatedly tells MD, "I messed up," explaining that she pulled out her IV yesterday "because I thought I was allergic to sodium," resulting in bleeding and need for replacement IV in the other arm. Pt perseverates on the idea that hospital is upset with her because she pulled out her IV, adding, "I would like to start again the correct way." Pt reports that she has been admitted to [facility] a total of 16 times, most recently in 10/2019 at [facility]. Pt says she felt she got good care during that admission, but after she returned to [facility], "I didn't feel like the same person. Something was different." When asked about her medications, pt says she "thinks" she takes clozapine but is unsure of the dose, and takes a total of "5 pills" for schizoaffective disorder but is unable to remember any of their names. Pt says she is willing to continue all of her home medications if MD is able to verify the names and dosages. Pt describes mood as "not my normal self" but denies SI/HI/AVH.

## 2020-05-26 NOTE — BEHAVIORAL HEALTH ASSESSMENT NOTE - SUMMARY
64F, single and disabled living at [facility] for past 20 yrs, with PHx of schizoaffective DO f/w [facility] psychiatrist Dr. Victor, on multiple medications including clozapine, and MHx of HTN, hypothyroidism, neurogenic bladder, anemia, GERD, constipation, cervical myelopathy, BIBEMS from [facility] for AMS and found to have fever 102F. Pt admitted for management of suspected infection. Psych consult was called for assistance with med management, given pt's reported PHx. On exam, pt presents pleasant and cooperative but with slightly odd affect. She appears very somatically preoccupied (which was noted in psych consult during previous ED visit in 10/2019) and perseverates on the idea that she "messed up" by pulling out her IV yesterday, which she fears has caused hospital staff to be annoyed or upset with her. Pt appears most likely to benefit from reinstatement of her home medications, as soon as they can be verified (as she has already been confirmed as an active pt in the clozapine REMS registry, pt should be continued on the starting dose of clozapine 25 mg qhs to prevent need for retitration). Pt does not appear to present an acute risk of harm to self or others at the time of assessment, and does not appear to be in need of admission to  psych at the time of assessment. 64F, single and disabled living at [facility] for past 20 yrs, with PHx of schizoaffective DO f/w [facility] psychiatrist Dr. Victor, on multiple medications including clozapine, and MHx of HTN, hypothyroidism, neurogenic bladder, anemia, GERD, constipation, cervical myelopathy, BIBEMS from [facility] for AMS and found to have fever 102F. Pt admitted for management of suspected infection. Psych consult was called for assistance with med management, given pt's reported PHx. On exam, pt presents pleasant and cooperative but with slightly odd affect. She appears very somatically preoccupied (which was noted in psych consult during previous ED visit in 10/2019) and perseverates on the idea that she "messed up" by pulling out her IV yesterday, which she fears has caused hospital staff to be annoyed or upset with her. Pt appears most likely to benefit from reinstatement of her home medications, as soon as they can be verified (as she has already been confirmed as an active pt in the clozapine REMS registry, pt should be continued on reported home dose of clozapine 25 mg BID to prevent need for retitration). Pt does not appear to present an acute risk of harm to self or others at the time of assessment, and does not appear to be in need of admission to  psych at the time of assessment.

## 2020-05-26 NOTE — DISCHARGE NOTE PROVIDER - PROVIDER TOKENS
PROVIDER:[TOKEN:[[id number]],FOLLOWUP:[2 weeks]],PROVIDER:[TOKEN:[[id number]],FOLLOWUP:[2 weeks]]

## 2020-05-26 NOTE — DISCHARGE NOTE PROVIDER - CARE PROVIDER_API CALL
Shannon Pérez)  Internal Medicine  [address] [address] [place] [address]  Phone: [phone]  Fax: [phone]  Follow Up Time: 2 weeks    Inessa Jones)  Psychiatry  [address] [place] [address]  Phone: [phone]  Follow Up Time: 2 weeks

## 2020-05-26 NOTE — BEHAVIORAL HEALTH ASSESSMENT NOTE - NSBHCHARTREVIEWLAB_PSY_A_CORE FT
11.5   5.71  )-----------( 261      ( 26 May 2020 07:16 )             36.4   05-    142  |  109<H>  |  11  ----------------------------<  99  3.6   |  26  |  0.71    Ca    9.3      26 May 2020 07:16  Phos  4.7       Mg     2.4         TPro  6.5  /  Alb  3.0<L>  /  TBili  0.3  /  DBili  x   /  AST  12  /  ALT  13  /  AlkPhos  77      Urinalysis Basic - ( 25 May 2020 00:21 )    Color: Yellow / Appearance: Clear / S.025 / pH: x  Gluc: x / Ketone: Negative  / Bili: Negative / Urobili: Negative   Blood: x / Protein: Negative / Nitrite: Negative   Leuk Esterase: Negative / RBC: 2-5 /HPF / WBC 0-2 /HPF   Sq Epi: x / Non Sq Epi: Few /HPF / Bacteria: Few /HPF

## 2020-05-26 NOTE — PROGRESS NOTE ADULT - PROBLEM SELECTOR PLAN 1
sent by AL for AMS, noted to have fever in AL,  - currently A&O x2  -CTH was negative for acute encephalopathy, NIHSS score cannot be assess as pt was not cooperative  - CXR clear   - UA -ve  - encourage good sleep hygiene, adequate lighting.   - Avoid use of anticholinergic, benzodiazepine and opioid medications  - Maintain daytime awakeness / night-time sleep  Syphillis -ve   UCx and BCx -ve  XR of abdomen is unremarkable  Psychiatry was consulted (left message to [id number])

## 2020-05-26 NOTE — BEHAVIORAL HEALTH ASSESSMENT NOTE - NSBHCHARTREVIEWIMAGING_PSY_A_CORE FT
< from: CT Head No Cont (05.25.20 @ 01:21) >    IMPRESSION:    No acute intracranial hemorrhage, mass effect, or CT evidence of a large vascular territory infarct. Mildly prominent empty sella. If there are new or persistent symptoms, consider further evaluation with MRI provided no contraindications.    < end of copied text >    < from: Xray Chest 1 View-PORTABLE IMMEDIATE (05.24.20 @ 23:04) >    Impression: No active infiltrates.    < end of copied text >

## 2020-05-26 NOTE — BEHAVIORAL HEALTH ASSESSMENT NOTE - NSBHCHARTREVIEWINVESTIGATE_PSY_A_CORE FT
< from: 12 Lead ECG (05.25.20 @ 02:58) >    QTC Calculation(Bezet) 426 ms    P Axis 69 degrees    R Axis 60 degrees    T Axis 55 degrees    Diagnosis Line *** Suspect unspecified pacemaker failure  Sinus rhythm with Fusion complexes  Nonspecific T wave abnormality  Abnormal ECG      < end of copied text >

## 2020-05-26 NOTE — BEHAVIORAL HEALTH ASSESSMENT NOTE - RISK ASSESSMENT
Low Acute Suicide Risk Risk factors: Older age, chronic SAD. Protective factors: Absent h/o SI/SA/SIB, stable domicile in psychiatric residence, engaged in treatment and taking medications. Risk level appears low at the time of assessment.

## 2020-05-26 NOTE — DISCHARGE NOTE PROVIDER - NSDCCAREPROVSEEN_GEN_ALL_CORE_FT
Shannon Pérez Bhaskar # DECOMPENSATED SCHIZOAFFECTIVE DISORDER  DUE TO MEDICATION NONCOMPLIANCE AT AL - CONTINUE CURRENT REGIMEN - CLOZAPINE AND CLONAZEPAM [ IS ALSO ON ABILIFY INJ]; PSYCHIATRY CONSULT TO TITRATE MEDICATION  # URINE CULTURE NEGATIVE - D/X ROCEPHIN  # HTN  # GERD  # D/C PLAN TO [facility] - COVID19 PCR NEGATIVE X 2    Shannon Pérez Bhaskar

## 2020-05-26 NOTE — BEHAVIORAL HEALTH ASSESSMENT NOTE - OTHER
Clozapine REMS registry Not assessed Not ambulating "Not my usual self" Perseverates on idea that she "messed up" by pulling out her IV Somatically preoccupied See above

## 2020-05-27 LAB
ANION GAP SERPL CALC-SCNC: 11 MMOL/L — SIGNIFICANT CHANGE UP (ref 5–17)
BASOPHILS # BLD AUTO: 0.02 K/UL — SIGNIFICANT CHANGE UP (ref 0–0.2)
BASOPHILS NFR BLD AUTO: 0.3 % — SIGNIFICANT CHANGE UP (ref 0–2)
BUN SERPL-MCNC: 16 MG/DL — SIGNIFICANT CHANGE UP (ref 7–18)
CALCIUM SERPL-MCNC: 9.4 MG/DL — SIGNIFICANT CHANGE UP (ref 8.4–10.5)
CHLORIDE SERPL-SCNC: 108 MMOL/L — SIGNIFICANT CHANGE UP (ref 96–108)
CO2 SERPL-SCNC: 22 MMOL/L — SIGNIFICANT CHANGE UP (ref 22–31)
CREAT SERPL-MCNC: 0.8 MG/DL — SIGNIFICANT CHANGE UP (ref 0.5–1.3)
EOSINOPHIL # BLD AUTO: 0.01 K/UL — SIGNIFICANT CHANGE UP (ref 0–0.5)
EOSINOPHIL NFR BLD AUTO: 0.1 % — SIGNIFICANT CHANGE UP (ref 0–6)
GLUCOSE SERPL-MCNC: 87 MG/DL — SIGNIFICANT CHANGE UP (ref 70–99)
HCT VFR BLD CALC: 36.4 % — SIGNIFICANT CHANGE UP (ref 34.5–45)
HGB BLD-MCNC: 11.4 G/DL — LOW (ref 11.5–15.5)
IMM GRANULOCYTES NFR BLD AUTO: 0.1 % — SIGNIFICANT CHANGE UP (ref 0–1.5)
LYMPHOCYTES # BLD AUTO: 2.63 K/UL — SIGNIFICANT CHANGE UP (ref 1–3.3)
LYMPHOCYTES # BLD AUTO: 37.8 % — SIGNIFICANT CHANGE UP (ref 13–44)
MAGNESIUM SERPL-MCNC: 2.4 MG/DL — SIGNIFICANT CHANGE UP (ref 1.6–2.6)
MCHC RBC-ENTMCNC: 29.7 PG — SIGNIFICANT CHANGE UP (ref 27–34)
MCHC RBC-ENTMCNC: 31.3 GM/DL — LOW (ref 32–36)
MCV RBC AUTO: 94.8 FL — SIGNIFICANT CHANGE UP (ref 80–100)
MONOCYTES # BLD AUTO: 0.66 K/UL — SIGNIFICANT CHANGE UP (ref 0–0.9)
MONOCYTES NFR BLD AUTO: 9.5 % — SIGNIFICANT CHANGE UP (ref 2–14)
NEUTROPHILS # BLD AUTO: 3.62 K/UL — SIGNIFICANT CHANGE UP (ref 1.8–7.4)
NEUTROPHILS NFR BLD AUTO: 52.2 % — SIGNIFICANT CHANGE UP (ref 43–77)
NRBC # BLD: 0 /100 WBCS — SIGNIFICANT CHANGE UP (ref 0–0)
PHOSPHATE SERPL-MCNC: 4.2 MG/DL — SIGNIFICANT CHANGE UP (ref 2.5–4.5)
PLATELET # BLD AUTO: 163 K/UL — SIGNIFICANT CHANGE UP (ref 150–400)
POTASSIUM SERPL-MCNC: 4.3 MMOL/L — SIGNIFICANT CHANGE UP (ref 3.5–5.3)
POTASSIUM SERPL-SCNC: 4.3 MMOL/L — SIGNIFICANT CHANGE UP (ref 3.5–5.3)
RBC # BLD: 3.84 M/UL — SIGNIFICANT CHANGE UP (ref 3.8–5.2)
RBC # FLD: 15.3 % — HIGH (ref 10.3–14.5)
SODIUM SERPL-SCNC: 141 MMOL/L — SIGNIFICANT CHANGE UP (ref 135–145)
WBC # BLD: 6.95 K/UL — SIGNIFICANT CHANGE UP (ref 3.8–10.5)
WBC # FLD AUTO: 6.95 K/UL — SIGNIFICANT CHANGE UP (ref 3.8–10.5)

## 2020-05-27 RX ORDER — OLANZAPINE 15 MG/1
5 TABLET, FILM COATED ORAL EVERY 12 HOURS
Refills: 0 | Status: DISCONTINUED | OUTPATIENT
Start: 2020-05-27 | End: 2020-05-27

## 2020-05-27 RX ADMIN — LACTULOSE 20 GRAM(S): 10 SOLUTION ORAL at 11:11

## 2020-05-27 RX ADMIN — CLOZAPINE 25 MILLIGRAM(S): 150 TABLET, ORALLY DISINTEGRATING ORAL at 07:59

## 2020-05-27 RX ADMIN — Medication 50 MILLIGRAM(S): at 06:16

## 2020-05-27 RX ADMIN — Medication 1000 UNIT(S): at 11:11

## 2020-05-27 RX ADMIN — CLOZAPINE 25 MILLIGRAM(S): 150 TABLET, ORALLY DISINTEGRATING ORAL at 17:06

## 2020-05-27 RX ADMIN — Medication 75 MICROGRAM(S): at 06:16

## 2020-05-27 RX ADMIN — Medication 0.5 MILLIGRAM(S): at 17:06

## 2020-05-27 RX ADMIN — Medication 0.5 MILLIGRAM(S): at 08:06

## 2020-05-27 RX ADMIN — ENOXAPARIN SODIUM 40 MILLIGRAM(S): 100 INJECTION SUBCUTANEOUS at 11:11

## 2020-05-27 NOTE — PROGRESS NOTE ADULT - ASSESSMENT
65yo F from [facility], walking with walker with PMH of schizoaffective disorder, HTN, hypothyroidism, neurologic bladder, anemia, GERD, constipation, cervical myelopathy was sent to ED for AMS. Pt was noted to have fever 102F and refused all PM meds.   In ED, pt is afebrile, pt is A&O x2,  oriented to self and her , and place, but she thinks it's September.   CTH: no acute pathology, EKG: NSR, QTc 426  CXR: clear, UA -ve, covid is pending  Pt kept saying " I need a treatment for schizophrenia, I'm taking 5 pills for that." But pt can't give the names if medications.   Also states " I want to go back to assisted living."

## 2020-05-27 NOTE — PROGRESS NOTE ADULT - PROBLEM SELECTOR PLAN 3
- pt is taking clonazepam 0.5mg BID, clozapine 25mg BID, no antipsychotic meds  - c/w home meds  - pt stating "I don't take shower because angels on my arms don't like it."  " Someone took my angels." as per PCP Dr. Pérez, pt is not like this usually   - left message to psych consult (2220)  - f/u Psychiatry Consult [ ] - pt is taking clonazepam 0.5mg BID, clozapine 25mg BID, no antipsychotic meds  - c/w home meds  - pt stating on admission  "I don't take shower because angels on my arms don't like it."  " Someone took my angels." as per PCP Dr. 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

## 2020-05-27 NOTE — PROGRESS NOTE ADULT - SUBJECTIVE AND OBJECTIVE BOX
PGY1 Note discussed with supervising resident and primary attending.    Patient is a 64y old  Female who presents with a chief complaint of AMS (25 May 2020 05:49)    INTERVAL HPI/OVERNIGHT EVENTS:  - No acute events overnight  - Hemodynamically stable and afebrile  - Tolerating a normal diet  - Disoriented and unable to communicate  - Evaluated by Psychiatry    MEDICATIONS  (STANDING):  bisacodyl 5 milliGRAM(s) Oral at bedtime  cefTRIAXone   IVPB 1000 milliGRAM(s) IV Intermittent every 24 hours  cholecalciferol 1000 Unit(s) Oral daily  clonazePAM  Tablet 0.5 milliGRAM(s) Oral <User Schedule>  cloZAPine 25 milliGRAM(s) Oral <User Schedule>  enoxaparin Injectable 40 milliGRAM(s) SubCutaneous daily  lactulose Syrup 20 Gram(s) Oral daily  levothyroxine 75 MICROGram(s) Oral daily  metoprolol succinate ER 50 milliGRAM(s) Oral daily  tamsulosin 0.4 milliGRAM(s) Oral at bedtime    MEDICATIONS  (PRN):  acetaminophen   Tablet .. 650 milliGRAM(s) Oral every 8 hours PRN Temp greater or equal to 38C (100.4F)    Allergies    Hair dye (Unknown)  No Known Drug Allergies    Intolerances    REVIEW OF SYSTEMS: Unable to evaluate due to mental impairment     Vital Signs Last 24 Hrs  T(C): 36.6 (26 May 2020 11:04), Max: 37.2 (25 May 2020 23:19)  T(F): 97.8 (26 May 2020 11:04), Max: 99 (25 May 2020 23:19)  HR: 78 (26 May 2020 11:06) (65 - 84)  BP: 97/61 (26 May 2020 11:04) (97/54 - 139/66)  BP(mean): 75 (25 May 2020 16:00) (75 - 75)  RR: 18 (26 May 2020 11:04) (17 - 18)  SpO2: 96% (26 May 2020 11:06) (96% - 100%)    PHYSICAL EXAM:  GENERAL: NAD, well-groomed, well-developed  HEAD:  Atraumatic, Normocephalic  EYES: EOMI, PERRLA, conjunctiva and sclera clear  NECK: Supple, No JVD, Normal thyroid  CHEST/LUNG: Clear to percussion bilaterally; No rales, rhonchi, wheezing, or rubs  HEART: Regular rate and rhythm; No murmurs, rubs, or gallops  ABDOMEN: Soft, Nontender, Nondistended; Bowel sounds present  NERVOUS SYSTEM:  Alert & Oriented X3, Good concentration; Motor Strength 5/5 B/L   EXTREMITIES:  2+ Peripheral Pulses, No clubbing, cyanosis, or edema  SKIN: Warm, Dry    LABS:                        11.5   5.71  )-----------( 261      ( 26 May 2020 07:16 )             36.4     05-    142  |  109<H>  |  11  ----------------------------<  99  3.6   |  26  |  0.71    Ca    9.3      26 May 2020 07:16  Phos  4.7       Mg     2.4         TPro  6.5  /  Alb  3.0<L>  /  TBili  0.3  /  DBili  x   /  AST  12  /  ALT  13  /  AlkPhos  77  -    PT/INR - ( 24 May 2020 22:48 )   PT: 11.6 sec;   INR: 1.03 ratio       PTT - ( 24 May 2020 22:48 )  PTT:43.5 sec  Urinalysis Basic - ( 25 May 2020 00:21 )    Color: Yellow / Appearance: Clear / S.025 / pH: x  Gluc: x / Ketone: Negative  / Bili: Negative / Urobili: Negative   Blood: x / Protein: Negative / Nitrite: Negative   Leuk Esterase: Negative / RBC: 2-5 /HPF / WBC 0-2 /HPF   Sq Epi: x / Non Sq Epi: Few /HPF / Bacteria: Few /HPF    CAPILLARY BLOOD GLUCOSE    RADIOLOGY & ADDITIONAL TESTS:    Imaging Personally Reviewed:  [ ] YES  [ ] NO    Consultant(s) Notes Reviewed:  [ ] YES  [ ] NO PGY1 Note discussed with supervising resident and primary attending.    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  - I had  call from pharmacy that Clozapine supply is limited, attempts made over weekend to obtain from other pharmacies however patient has only one more day supply left.       MEDICATIONS  (STANDING):  bisacodyl 5 milliGRAM(s) Oral at bedtime  cholecalciferol 1000 Unit(s) Oral daily  clonazePAM  Tablet 0.5 milliGRAM(s) Oral <User Schedule>  cloZAPine 25 milliGRAM(s) Oral <User Schedule>  enoxaparin Injectable 40 milliGRAM(s) SubCutaneous daily  lactulose Syrup 20 Gram(s) Oral daily  levothyroxine 75 MICROGram(s) Oral daily  metoprolol succinate ER 50 milliGRAM(s) Oral daily  tamsulosin 0.4 milliGRAM(s) Oral at bedtime    MEDICATIONS  (PRN):  acetaminophen   Tablet .. 650 milliGRAM(s) Oral every 8 hours PRN Temp greater or equal to 38C (100.4F)      Allergies    Hair dye (Unknown)  No Known Drug Allergies    Intolerances    REVIEW OF SYSTEMS: Unable to evaluate due to mental impairment     Vital Signs Last 24 Hrs  T(C): 37 (27 May 2020 17:12), Max: 37 (27 May 2020 17:12)  T(F): 98.6 (27 May 2020 17:12), Max: 98.6 (27 May 2020 17:12)  HR: 82 (27 May 2020 17:12) (74 - 87)  BP: 110/65 (27 May 2020 17:12) (92/46 - 124/70)  BP(mean): --  RR: 18 (27 May 2020 14:09) (17 - 18)  SpO2: 100% (27 May 2020 14:09) (99% - 100%)  PHYSICAL EXAM:  GENERAL: NAD, well-groomed, well-developed  HEAD:  Atraumatic, Normocephalic  EYES: EOMI, PERRLA, conjunctiva and sclera clear  NECK: Supple, No JVD, Normal thyroid  CHEST/LUNG: Clear to percussion bilaterally; No rales, rhonchi, wheezing, or rubs  HEART: Regular rate and rhythm; No murmurs, rubs, or gallops  ABDOMEN: Soft, Nontender, Nondistended; Bowel sounds present  NERVOUS SYSTEM:  Alert & Oriented X3, Good concentration; Motor Strength 5/5 B/L   EXTREMITIES:  2+ Peripheral Pulses, No clubbing, cyanosis, or edema  SKIN: Warm, Dry                          11.4   6.95  )-----------( 163      ( 27 May 2020 06:48 )             36.4       141  |  108  |  16  ----------------------------<  87  4.3   |  22  |  0.80    Ca    9.4      27 May 2020 06:48  Phos  4.2       Mg     2.4         TPro  6.5  /  Alb  3.0<L>  /  TBili  0.3  /  DBili  x   /  AST  12  /  ALT  13  /  AlkPhos  77  -      Color: Yellow / Appearance: Clear / S.025 / pH: x  Gluc: x / Ketone: Negative  / Bili: Negative / Urobili: Negative   Blood: x / Protein: Negative / Nitrite: Negative   Leuk Esterase: Negative / RBC: 2-5 /HPF / WBC 0-2 /HPF   Sq Epi: x / Non Sq Epi: Few /HPF / Bacteria: Few /HPF    CAPILLARY BLOOD GLUCOSE    RADIOLOGY & ADDITIONAL TESTS:    Imaging Personally Reviewed:  [ ] YES  [ ] NO    Consultant(s) Notes Reviewed:  [ ] YES  [ ] NO

## 2020-05-28 ENCOUNTER — TRANSCRIPTION ENCOUNTER (OUTPATIENT)
Age: 65
End: 2020-05-28

## 2020-05-28 LAB
ANION GAP SERPL CALC-SCNC: 8 MMOL/L — SIGNIFICANT CHANGE UP (ref 5–17)
BASOPHILS # BLD AUTO: 0.02 K/UL — SIGNIFICANT CHANGE UP (ref 0–0.2)
BASOPHILS NFR BLD AUTO: 0.3 % — SIGNIFICANT CHANGE UP (ref 0–2)
BUN SERPL-MCNC: 19 MG/DL — HIGH (ref 7–18)
CALCIUM SERPL-MCNC: 9.3 MG/DL — SIGNIFICANT CHANGE UP (ref 8.4–10.5)
CHLORIDE SERPL-SCNC: 109 MMOL/L — HIGH (ref 96–108)
CO2 SERPL-SCNC: 25 MMOL/L — SIGNIFICANT CHANGE UP (ref 22–31)
CREAT SERPL-MCNC: 0.73 MG/DL — SIGNIFICANT CHANGE UP (ref 0.5–1.3)
EOSINOPHIL # BLD AUTO: 0.02 K/UL — SIGNIFICANT CHANGE UP (ref 0–0.5)
EOSINOPHIL NFR BLD AUTO: 0.3 % — SIGNIFICANT CHANGE UP (ref 0–6)
GLUCOSE SERPL-MCNC: 96 MG/DL — SIGNIFICANT CHANGE UP (ref 70–99)
HCT VFR BLD CALC: 37.6 % — SIGNIFICANT CHANGE UP (ref 34.5–45)
HGB BLD-MCNC: 11.8 G/DL — SIGNIFICANT CHANGE UP (ref 11.5–15.5)
IMM GRANULOCYTES NFR BLD AUTO: 0.3 % — SIGNIFICANT CHANGE UP (ref 0–1.5)
LYMPHOCYTES # BLD AUTO: 2.08 K/UL — SIGNIFICANT CHANGE UP (ref 1–3.3)
LYMPHOCYTES # BLD AUTO: 34.9 % — SIGNIFICANT CHANGE UP (ref 13–44)
MAGNESIUM SERPL-MCNC: 2.5 MG/DL — SIGNIFICANT CHANGE UP (ref 1.6–2.6)
MCHC RBC-ENTMCNC: 30.1 PG — SIGNIFICANT CHANGE UP (ref 27–34)
MCHC RBC-ENTMCNC: 31.4 GM/DL — LOW (ref 32–36)
MCV RBC AUTO: 95.9 FL — SIGNIFICANT CHANGE UP (ref 80–100)
MONOCYTES # BLD AUTO: 0.51 K/UL — SIGNIFICANT CHANGE UP (ref 0–0.9)
MONOCYTES NFR BLD AUTO: 8.6 % — SIGNIFICANT CHANGE UP (ref 2–14)
NEUTROPHILS # BLD AUTO: 3.31 K/UL — SIGNIFICANT CHANGE UP (ref 1.8–7.4)
NEUTROPHILS NFR BLD AUTO: 55.6 % — SIGNIFICANT CHANGE UP (ref 43–77)
NRBC # BLD: 0 /100 WBCS — SIGNIFICANT CHANGE UP (ref 0–0)
PHOSPHATE SERPL-MCNC: 4 MG/DL — SIGNIFICANT CHANGE UP (ref 2.5–4.5)
PLATELET # BLD AUTO: 270 K/UL — SIGNIFICANT CHANGE UP (ref 150–400)
POTASSIUM SERPL-MCNC: 4 MMOL/L — SIGNIFICANT CHANGE UP (ref 3.5–5.3)
POTASSIUM SERPL-SCNC: 4 MMOL/L — SIGNIFICANT CHANGE UP (ref 3.5–5.3)
RBC # BLD: 3.92 M/UL — SIGNIFICANT CHANGE UP (ref 3.8–5.2)
RBC # FLD: 15.1 % — HIGH (ref 10.3–14.5)
SODIUM SERPL-SCNC: 142 MMOL/L — SIGNIFICANT CHANGE UP (ref 135–145)
WBC # BLD: 5.96 K/UL — SIGNIFICANT CHANGE UP (ref 3.8–10.5)
WBC # FLD AUTO: 5.96 K/UL — SIGNIFICANT CHANGE UP (ref 3.8–10.5)

## 2020-05-28 RX ORDER — CLOZAPINE 150 MG/1
1 TABLET, ORALLY DISINTEGRATING ORAL
Qty: 60 | Refills: 0
Start: 2020-05-28 | End: 2020-06-26

## 2020-05-28 RX ORDER — ARIPIPRAZOLE 15 MG/1
1 TABLET ORAL
Qty: 0 | Refills: 0 | DISCHARGE

## 2020-05-28 RX ORDER — CLONAZEPAM 1 MG
1 TABLET ORAL
Qty: 0 | Refills: 0 | DISCHARGE
Start: 2020-05-28

## 2020-05-28 RX ORDER — METOPROLOL TARTRATE 50 MG
1 TABLET ORAL
Qty: 0 | Refills: 0 | DISCHARGE

## 2020-05-28 RX ORDER — CLOZAPINE 150 MG/1
25 TABLET, ORALLY DISINTEGRATING ORAL
Refills: 0 | Status: DISCONTINUED | OUTPATIENT
Start: 2020-05-28 | End: 2020-05-29

## 2020-05-28 RX ORDER — CLOZAPINE 150 MG/1
1 TABLET, ORALLY DISINTEGRATING ORAL
Qty: 0 | Refills: 0 | DISCHARGE

## 2020-05-28 RX ORDER — METOPROLOL TARTRATE 50 MG
1 TABLET ORAL
Qty: 0 | Refills: 0 | DISCHARGE
Start: 2020-05-28

## 2020-05-28 RX ORDER — CLONAZEPAM 1 MG
1 TABLET ORAL
Qty: 0 | Refills: 0 | DISCHARGE

## 2020-05-28 RX ADMIN — Medication 1000 UNIT(S): at 11:16

## 2020-05-28 RX ADMIN — TAMSULOSIN HYDROCHLORIDE 0.4 MILLIGRAM(S): 0.4 CAPSULE ORAL at 21:42

## 2020-05-28 RX ADMIN — Medication 75 MICROGRAM(S): at 05:32

## 2020-05-28 RX ADMIN — Medication 50 MILLIGRAM(S): at 05:32

## 2020-05-28 RX ADMIN — Medication 0.5 MILLIGRAM(S): at 09:50

## 2020-05-28 RX ADMIN — Medication 5 MILLIGRAM(S): at 21:42

## 2020-05-28 RX ADMIN — CLOZAPINE 25 MILLIGRAM(S): 150 TABLET, ORALLY DISINTEGRATING ORAL at 09:36

## 2020-05-28 RX ADMIN — Medication 0.5 MILLIGRAM(S): at 17:21

## 2020-05-28 RX ADMIN — CLOZAPINE 25 MILLIGRAM(S): 150 TABLET, ORALLY DISINTEGRATING ORAL at 17:22

## 2020-05-28 NOTE — PROGRESS NOTE ADULT - SUBJECTIVE AND OBJECTIVE BOX
PGY1 Note discussed with supervising resident and primary attending.    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  - I had  call from pharmacy that Clozapine supply is limited, attempts made over weekend to obtain from other pharmacies however patient has only one more day supply left.     MEDICATIONS  (STANDING):  bisacodyl 5 milliGRAM(s) Oral at bedtime  cholecalciferol 1000 Unit(s) Oral daily  clonazePAM  Tablet 0.5 milliGRAM(s) Oral <User Schedule>  cloZAPine 25 milliGRAM(s) Oral <User Schedule>  enoxaparin Injectable 40 milliGRAM(s) SubCutaneous daily  lactulose Syrup 20 Gram(s) Oral daily  levothyroxine 75 MICROGram(s) Oral daily  metoprolol succinate ER 50 milliGRAM(s) Oral daily  tamsulosin 0.4 milliGRAM(s) Oral at bedtime    MEDICATIONS  (PRN):  acetaminophen   Tablet .. 650 milliGRAM(s) Oral every 8 hours PRN Temp greater or equal to 38C (100.4F)        Allergies    Hair dye (Unknown)  No Known Drug Allergies    Intolerances    REVIEW OF SYSTEMS: Unable to evaluate due to mental impairment     Vital Signs Last 24 Hrs  T(C): 36.3 (28 May 2020 13:29), Max: 37 (27 May 2020 17:12)  T(F): 97.4 (28 May 2020 13:29), Max: 98.6 (27 May 2020 17:12)  HR: 83 (28 May 2020 13:29) (67 - 93)  BP: 102/63 (28 May 2020 13:29) (102/63 - 139/70)  BP(mean): --  RR: 17 (28 May 2020 13:29) (17 - 18)  SpO2: 96% (28 May 2020 13:29) (96% - 99%)  GENERAL: NAD, well-groomed, well-developed  HEAD:  Atraumatic, Normocephalic  EYES: EOMI, PERRLA, conjunctiva and sclera clear  NECK: Supple, No JVD, Normal thyroid  CHEST/LUNG: Clear to percussion bilaterally; No rales, rhonchi, wheezing, or rubs  HEART: Regular rate and rhythm; No murmurs, rubs, or gallops  ABDOMEN: Soft, Nontender, Nondistended; Bowel sounds present  NERVOUS SYSTEM:  Alert & Oriented X3, Good concentration; Motor Strength 5/5 B/L   EXTREMITIES:  2+ Peripheral Pulses, No clubbing, cyanosis, or edema  SKIN: Warm, Dry                             11.8   5.96  )-----------( 270      ( 28 May 2020 07:30 )             37.6   28    142  |  109<H>  |  19<H>  ----------------------------<  96  4.0   |  25  |  0.73    Ca    9.3      28 May 2020 07:30  Phos  4.0       Mg     2.5               Color: Yellow / Appearance: Clear / S.025 / pH: x  Gluc: x / Ketone: Negative  / Bili: Negative / Urobili: Negative   Blood: x / Protein: Negative / Nitrite: Negative   Leuk Esterase: Negative / RBC: 2-5 /HPF / WBC 0-2 /HPF   Sq Epi: x / Non Sq Epi: Few /HPF / Bacteria: Few /HPF    CAPILLARY BLOOD GLUCOSE    RADIOLOGY & ADDITIONAL TESTS:    Imaging Personally Reviewed:  [ ] YES  [ ] NO    Consultant(s) Notes Reviewed:  [ ] YES  [ ] NO

## 2020-05-28 NOTE — PROGRESS NOTE ADULT - ASSESSMENT
63yo F from [facility], walking with walker with PMH of schizoaffective disorder, HTN, hypothyroidism, neurologic bladder, anemia, GERD, constipation, cervical myelopathy was sent to ED for AMS. Pt was noted to have fever 102F and refused all PM meds.   In ED, pt is afebrile, pt is A&O x2,  oriented to self and her , and place, but she thinks it's September.   CTH: no acute pathology, EKG: NSR, QTc 426  CXR: clear, UA -ve, covid is pending  Pt kept saying " I need a treatment for schizophrenia, I'm taking 5 pills for that." But pt can't give the names if medications.   Also states " I want to go back to assisted living."

## 2020-05-28 NOTE — DISCHARGE NOTE NURSING/CASE MANAGEMENT/SOCIAL WORK - PATIENT PORTAL LINK FT
You can access the FollowMyHealth Patient Portal offered by [facility] by registering at the following website: [email or web address]. By joining [facility]’s FollowMyHealth portal, you will also be able to view your health information using other applications (apps) compatible with our system.

## 2020-05-28 NOTE — PROGRESS NOTE ADULT - ATTENDING COMMENTS
63yo F from [facility], walking with walker with PMH of schizoaffective disorder, HTN, hypothyroidism, neurologic bladder, anemia, GERD, constipation, cervical myelopathy was sent to ED for AMS. Pt was noted to have fever 102F and refused all PM meds. No chest pain, no shortness of breath, no reported vomiting.    # DECOMPENSATED SCHIZOAFFECTIVE DISORDER  DUE TO MEDICATION NONCOMPLIANCE AT AL - CONTINUE CURRENT REGIMEN - CLOZAPINE AND CLONAZEPAM [ IS ALSO ON ABILIFY INJ]; PSYCHIATRY CONSULT IN PROGRESS TO TITRATE MEDICATION  # URINE CULTURE NEGATIVE - D/X ROCEPHIN  # HTN  # GERD  # D/C PLAN TO [facility] IN A.M. - AWAITING REPEAT COVID19 PCR PER GUIDELINE  # GI AND DVT PPX    ALONSO ELLSWORTH M.D. COVERING FOR BRAN ELLSWORTH M.D.
63yo F from [facility], walking with walker with PMH of schizoaffective disorder, HTN, hypothyroidism, neurologic bladder, anemia, GERD, constipation, cervical myelopathy was sent to ED for AMS. Pt was noted to have fever 102F and refused all PM meds. No chest pain, no shortness of breath, no reported vomiting.    # SUSPECTED UTI - IV ROCEPHIN; FOLLOW-UP CULTURES  # DECOMPENSATED SCHIZOAFFECTIVE DISORDER - CONTINUE CURRENT REGIMEN - CLOZAPINE; PSYCHIATRY CONSULT IN PROGRESS TO TITRATE MEDICATION  # HTN  # GERD  # D/C PLAN TO [facility] ONCE MEDICALLY STABLE  # GI AND DVT PPX    ALONSO ELLSWORTH M.D. COVERING FOR BRAN ELLSWORTH M.D.
63yo F from [facility], walking with walker with PMH of schizoaffective disorder, HTN, hypothyroidism, neurologic bladder, anemia, GERD, constipation, cervical myelopathy was sent to ED for AMS. Pt was noted to have fever 102F and refused all PM meds. No chest pain, no shortness of breath, no reported vomiting.    # DECOMPENSATED SCHIZOAFFECTIVE DISORDER  DUE TO MEDICATION NONCOMPLIANCE AT AL - CONTINUE CURRENT REGIMEN - CLOZAPINE AND CLONAZEPAM [ IS ALSO ON ABILIFY INJ]; PSYCHIATRY CONSULT IN PROGRESS TO TITRATE MEDICATION  # URINE CULTURE NEGATIVE - D/X ROCEPHIN  # HTN  # GERD  # D/C PLAN TO [facility] IN A.M.  # GI AND DVT PPX    ALONSO ELLSWORTH M.D. COVERING FOR BRAN ELLSWORTH M.D.

## 2020-05-28 NOTE — PROGRESS NOTE ADULT - PROBLEM SELECTOR PLAN 3
- pt is taking clonazepam 0.5mg BID, clozapine 25mg BID, no antipsychotic meds  - c/w home meds  - pt stating on admission  "I don't take shower because angels on my arms don't like it."  " Someone took my angels." as per PCP Dr. 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

## 2020-05-29 VITALS
RESPIRATION RATE: 18 BRPM | HEART RATE: 75 BPM | DIASTOLIC BLOOD PRESSURE: 58 MMHG | OXYGEN SATURATION: 100 % | SYSTOLIC BLOOD PRESSURE: 107 MMHG

## 2020-05-29 LAB
ANION GAP SERPL CALC-SCNC: 6 MMOL/L — SIGNIFICANT CHANGE UP (ref 5–17)
ANISOCYTOSIS BLD QL: SLIGHT — SIGNIFICANT CHANGE UP
BASOPHILS # BLD AUTO: 0.01 K/UL — SIGNIFICANT CHANGE UP (ref 0–0.2)
BASOPHILS NFR BLD AUTO: 0.2 % — SIGNIFICANT CHANGE UP (ref 0–2)
BUN SERPL-MCNC: 23 MG/DL — HIGH (ref 7–18)
CALCIUM SERPL-MCNC: 9 MG/DL — SIGNIFICANT CHANGE UP (ref 8.4–10.5)
CHLORIDE SERPL-SCNC: 110 MMOL/L — HIGH (ref 96–108)
CO2 SERPL-SCNC: 25 MMOL/L — SIGNIFICANT CHANGE UP (ref 22–31)
CREAT SERPL-MCNC: 0.69 MG/DL — SIGNIFICANT CHANGE UP (ref 0.5–1.3)
EOSINOPHIL # BLD AUTO: 0.1 K/UL — SIGNIFICANT CHANGE UP (ref 0–0.5)
EOSINOPHIL NFR BLD AUTO: 1.7 % — SIGNIFICANT CHANGE UP (ref 0–6)
GLUCOSE SERPL-MCNC: 90 MG/DL — SIGNIFICANT CHANGE UP (ref 70–99)
HCT VFR BLD CALC: 38.2 % — SIGNIFICANT CHANGE UP (ref 34.5–45)
HGB BLD-MCNC: 11.9 G/DL — SIGNIFICANT CHANGE UP (ref 11.5–15.5)
IMM GRANULOCYTES NFR BLD AUTO: 0.3 % — SIGNIFICANT CHANGE UP (ref 0–1.5)
LYMPHOCYTES # BLD AUTO: 2.1 K/UL — SIGNIFICANT CHANGE UP (ref 1–3.3)
LYMPHOCYTES # BLD AUTO: 35.2 % — SIGNIFICANT CHANGE UP (ref 13–44)
MAGNESIUM SERPL-MCNC: 2.3 MG/DL — SIGNIFICANT CHANGE UP (ref 1.6–2.6)
MANUAL SMEAR VERIFICATION: SIGNIFICANT CHANGE UP
MCHC RBC-ENTMCNC: 30.3 PG — SIGNIFICANT CHANGE UP (ref 27–34)
MCHC RBC-ENTMCNC: 31.2 GM/DL — LOW (ref 32–36)
MCV RBC AUTO: 97.2 FL — SIGNIFICANT CHANGE UP (ref 80–100)
MONOCYTES # BLD AUTO: 0.55 K/UL — SIGNIFICANT CHANGE UP (ref 0–0.9)
MONOCYTES NFR BLD AUTO: 9.2 % — SIGNIFICANT CHANGE UP (ref 2–14)
NEUTROPHILS # BLD AUTO: 3.18 K/UL — SIGNIFICANT CHANGE UP (ref 1.8–7.4)
NEUTROPHILS NFR BLD AUTO: 53.4 % — SIGNIFICANT CHANGE UP (ref 43–77)
NRBC # BLD: 0 /100 WBCS — SIGNIFICANT CHANGE UP (ref 0–0)
PHOSPHATE SERPL-MCNC: 4.2 MG/DL — SIGNIFICANT CHANGE UP (ref 2.5–4.5)
PLAT MORPH BLD: NORMAL — SIGNIFICANT CHANGE UP
PLATELET # BLD AUTO: 246 K/UL — SIGNIFICANT CHANGE UP (ref 150–400)
PLATELET COUNT - ESTIMATE: NORMAL — SIGNIFICANT CHANGE UP
POIKILOCYTOSIS BLD QL AUTO: SLIGHT — SIGNIFICANT CHANGE UP
POTASSIUM SERPL-MCNC: 3.8 MMOL/L — SIGNIFICANT CHANGE UP (ref 3.5–5.3)
POTASSIUM SERPL-SCNC: 3.8 MMOL/L — SIGNIFICANT CHANGE UP (ref 3.5–5.3)
RBC # BLD: 3.93 M/UL — SIGNIFICANT CHANGE UP (ref 3.8–5.2)
RBC # FLD: 15.3 % — HIGH (ref 10.3–14.5)
RBC BLD AUTO: ABNORMAL
SARS-COV-2 RNA SPEC QL NAA+PROBE: SIGNIFICANT CHANGE UP
SODIUM SERPL-SCNC: 141 MMOL/L — SIGNIFICANT CHANGE UP (ref 135–145)
WBC # BLD: 5.96 K/UL — SIGNIFICANT CHANGE UP (ref 3.8–10.5)
WBC # FLD AUTO: 5.96 K/UL — SIGNIFICANT CHANGE UP (ref 3.8–10.5)

## 2020-05-29 RX ADMIN — Medication 75 MICROGRAM(S): at 05:47

## 2020-05-29 RX ADMIN — CLOZAPINE 25 MILLIGRAM(S): 150 TABLET, ORALLY DISINTEGRATING ORAL at 08:45

## 2020-05-29 RX ADMIN — Medication 50 MILLIGRAM(S): at 05:47

## 2020-05-29 RX ADMIN — Medication 1000 UNIT(S): at 12:11

## 2020-05-29 RX ADMIN — Medication 0.5 MILLIGRAM(S): at 08:45

## 2020-05-29 RX ADMIN — LACTULOSE 20 GRAM(S): 10 SOLUTION ORAL at 12:12

## 2020-05-29 RX ADMIN — ENOXAPARIN SODIUM 40 MILLIGRAM(S): 100 INJECTION SUBCUTANEOUS at 12:11

## 2020-05-30 LAB
CULTURE RESULTS: SIGNIFICANT CHANGE UP
CULTURE RESULTS: SIGNIFICANT CHANGE UP
SPECIMEN SOURCE: SIGNIFICANT CHANGE UP
SPECIMEN SOURCE: SIGNIFICANT CHANGE UP

## 2020-06-02 PROCEDURE — 99285 EMERGENCY DEPT VISIT HI MDM: CPT

## 2020-06-02 PROCEDURE — 70450 CT HEAD/BRAIN W/O DYE: CPT

## 2020-06-02 PROCEDURE — 82607 VITAMIN B-12: CPT

## 2020-06-02 PROCEDURE — 84443 ASSAY THYROID STIM HORMONE: CPT

## 2020-06-02 PROCEDURE — 86780 TREPONEMA PALLIDUM: CPT

## 2020-06-02 PROCEDURE — 82306 VITAMIN D 25 HYDROXY: CPT

## 2020-06-02 PROCEDURE — 84100 ASSAY OF PHOSPHORUS: CPT

## 2020-06-02 PROCEDURE — 87040 BLOOD CULTURE FOR BACTERIA: CPT

## 2020-06-02 PROCEDURE — 85652 RBC SED RATE AUTOMATED: CPT

## 2020-06-02 PROCEDURE — 82746 ASSAY OF FOLIC ACID SERUM: CPT

## 2020-06-02 PROCEDURE — 81001 URINALYSIS AUTO W/SCOPE: CPT

## 2020-06-02 PROCEDURE — 80053 COMPREHEN METABOLIC PANEL: CPT

## 2020-06-02 PROCEDURE — 87086 URINE CULTURE/COLONY COUNT: CPT

## 2020-06-02 PROCEDURE — U0003: CPT

## 2020-06-02 PROCEDURE — 36415 COLL VENOUS BLD VENIPUNCTURE: CPT

## 2020-06-02 PROCEDURE — 82962 GLUCOSE BLOOD TEST: CPT

## 2020-06-02 PROCEDURE — 86803 HEPATITIS C AB TEST: CPT

## 2020-06-02 PROCEDURE — 85027 COMPLETE CBC AUTOMATED: CPT

## 2020-06-02 PROCEDURE — 82140 ASSAY OF AMMONIA: CPT

## 2020-06-02 PROCEDURE — 74018 RADEX ABDOMEN 1 VIEW: CPT

## 2020-06-02 PROCEDURE — 93005 ELECTROCARDIOGRAM TRACING: CPT

## 2020-06-02 PROCEDURE — 80048 BASIC METABOLIC PNL TOTAL CA: CPT

## 2020-06-02 PROCEDURE — 73522 X-RAY EXAM HIPS BI 3-4 VIEWS: CPT

## 2020-06-02 PROCEDURE — 85730 THROMBOPLASTIN TIME PARTIAL: CPT

## 2020-06-02 PROCEDURE — 83036 HEMOGLOBIN GLYCOSYLATED A1C: CPT

## 2020-06-02 PROCEDURE — 85610 PROTHROMBIN TIME: CPT

## 2020-06-02 PROCEDURE — 71045 X-RAY EXAM CHEST 1 VIEW: CPT

## 2020-06-02 PROCEDURE — 80061 LIPID PANEL: CPT

## 2020-06-02 PROCEDURE — 83735 ASSAY OF MAGNESIUM: CPT

## 2020-06-02 PROCEDURE — 99053 MED SERV 10PM-8AM 24 HR FAC: CPT

## 2020-06-02 PROCEDURE — 96374 THER/PROPH/DIAG INJ IV PUSH: CPT

## 2020-10-30 NOTE — ED PROVIDER NOTE - TEMPLATE, MLM
Received a denial again on the [address] spoke to the pt she voice understood and she already has an apt with neurology on the 23rd. She will work with them to see what they can do for her.
General

## 2021-07-20 NOTE — DISCHARGE NOTE PROVIDER - NSDCMRMEDTOKEN_GEN_ALL_CORE_FT
Protocol For Photochemotherapy: Mineral Oil And Nbuvb: The patient received Photochemotherapy: Mineral Oil and NBUVB (mineral oil applied to all lesions prior to phototherapy). Treatment Number: 23 Protocol For Photochemotherapy: Petrolatum And Broad Band Uvb: The patient received Photochemotherapy: Petrolatum and Broad Band UVB. Render Post-Care In The Note: no Protocol For Photochemotherapy: Mineral Oil And Broad Band Uvb: The patient received Photochemotherapy: Mineral Oil and Broad Band UVB. Protocol For Uva: The patient received UVA. Protocol For Nbuvb: The patient received NBUVB. Protocol For Photochemotherapy For Severe Photoresponsive Dermatoses: Puva: The patient received Photochemotherapy for severe photoresponsive dermatoses: PUVA requiring at least 4 to 8 hours of care under direct physician supervision. Name Of Supervising Technician: Lianna Weaver MA Protocol For Photochemotherapy: Triamcinolone Ointment And Nbuvb: The patient received Photochemotherapy: Triamcinolone and NBUVB (triamcinolone ointment applied to all lesions prior to phototherapy). Skin Type: III Protocol For Photochemotherapy: Tar And Nbuvb (Goeckerman Treatment): The patient received Photochemotherapy: Tar and NBUVB (Goeckerman treatment). Protocol For Photochemotherapy For Severe Photoresponsive Dermatoses: Tar And Nbuvb (Goeckerman Treatment): The patient received Photochemotherapy for severe photoresponsive dermatoses: Tar and NBUVB (Goeckerman treatment) requiring at least 4 to 8 hours of care under direct physician supervision. Protocol For Photochemotherapy For Severe Photoresponsive Dermatoses: Tar And Broad Band Uvb (Goeckerman Treatment): The patient received Photochemotherapy for severe photoresponsive dermatoses: Tar and Broad Band UVB (Goeckerman treatment) requiring at least 4 to 8 hours of care under direct physician supervision. Protocol For Uva1: The patient received UVA1. Protocol For Bath Puva: The patient received Bath PUVA. Comments On Previous Treatment: Patient denies any redness or discomfort from previous treatment. Protocol For Protocol For Photochemotherapy For Severe Photoresponsive Dermatoses: Bath Puva: The patient received Photochemotherapy for severe photoresponsive dermatoses: Bath PUVA requiring at least 4 to 8 hours of care under direct physician supervision. Post-Care Instructions: I reviewed with the patient in detail post-care instructions. Patient is to wear sun protection. Patients may expect sunburn like redness, discomfort and scabbing. Protocol For Photochemotherapy For Severe Photoresponsive Dermatoses: Petrolatum And Nbuvb: The patient received Photochemotherapy for severe photoresponsive dermatoses: Petrolatum and NBUVB requiring at least 4 to 8 hours of care under direct physician supervision. Additional Shield Locations: Covers face with towel Protocol For Photochemotherapy: Petrolatum And Nbuvb: The patient received Photochemotherapy: Petrolatum and NBUVB (petrolatum applied to all lesions prior to phototherapy). Aristada 882 mg/3.2 mL intramuscular suspension, extended release: 1 application intramuscular once a month  clonazePAM 0.5 mg oral tablet: 1 tab(s) orally 2 times a day  Clozaril 25 mg oral tablet: 1 tab(s) orally 2 times a day  Dulcolax Laxative 5 mg oral delayed release tablet: 1 tab(s) orally once a day (in the evening)  Flomax 0.4 mg oral capsule: 1 cap(s) orally once a day (in the evening)  metoprolol succinate 50 mg oral tablet, extended release: 1 tab(s) orally once a day  Synthroid 75 mcg (0.075 mg) oral tablet: 1 tab(s) orally once a day (before a meal)  Tylenol 325 mg oral tablet: 2 tab(s) orally every 8 hours, As Needed  Vitamin D3 1000 intl units (25 mcg) oral tablet: 1 tab(s) orally once a day Protocol For Broad Band Uvb: The patient received Broad Band UVB. Protocol: NBUVB Protocol For Photochemotherapy: Baby Oil And Nbuvb: The patient received Photochemotherapy: Baby Oil and NBUVB (baby oil applied to all lesions prior to phototherapy). Protocol For Photochemotherapy: Tar And Broad Band Uvb (Goeckerman Treatment): The patient received Photochemotherapy: Tar and Broad Band UVB (Goeckerman treatment). Changes In Treatment Protocol: Per protocol, increased treatment time by 15 seconds. Consent: Written consent obtained.  The risks were reviewed with the patient including but not limited to: burn, pigmentary changes, pain, blistering, scabbing, redness, increased risk of skin cancers, and the remote possibility of scarring. Detail Level: Zone Protocol For Photochemotherapy For Severe Photoresponsive Dermatoses: Petrolatum And Broad Band Uvb: The patient received Photochemotherapyfor severe photoresponsive dermatoses: Petrolatum and Broad Band UVB requiring at least 4 to 8 hours of care under direct physician supervision. Total Treatment Time: 2 minute 30 seconds Protocol For Puva: The patient received PUVA. Protocol For Nb Uva: The patient received NB UVA. clonazePAM 0.5 mg oral tablet: 1 tab(s) orally   cloZAPine 25 mg oral tablet: 1 tab(s) orally 2 times a day   Dulcolax Laxative 5 mg oral delayed release tablet: 1 tab(s) orally once a day (in the evening)  Flomax 0.4 mg oral capsule: 1 cap(s) orally once a day (in the evening)  metoprolol succinate 50 mg oral tablet, extended release: 1 tab(s) orally once a day  Synthroid 75 mcg (0.075 mg) oral tablet: 1 tab(s) orally once a day (before a meal)  Tylenol 325 mg oral tablet: 2 tab(s) orally every 8 hours, As Needed  Vitamin D3 1000 intl units (25 mcg) oral tablet: 1 tab(s) orally once a day

## 2021-11-02 NOTE — ED PROVIDER NOTE - CARE PLAN
----- Message from CARLOS Villa sent at 10/21/2021  4:13 PM EDT -----  Please call the patient regarding his abnormal result.    Cholesterol panel looks very good.  Total cholesterol was 98 and the goal is for this to be less than 200.  Triglycerides were 145, the goal is for this to be less than 150.  At the time of his last cholesterol panel the triglycerides were abnormally elevated at 274.  LDL cholesterol is the bad cholesterol.  We want this number to be less than 70 and it was 47.Comprehensive metabolic panel shows an elevated sugar of 133.  Although this is elevated it is a much better sugar than you had during her hospitalization the creatinine is slightly elevated at 1.31 up to 1.27 is normal.Keep an eye on your creatinine.  This is an indication of kidney function.Vitamin D was normal as was the CK-MB.  CBC was stable when compared to hospitalization 1 month ago.  You are slightly anemic with a hemoglobin of 12.3 but this actually is an improvement from previous lab.Thanks,   Principal Discharge DX:	Schizoaffective disorder, unspecified type

## 2022-02-16 NOTE — DISCHARGE NOTE NURSING/CASE MANAGEMENT/SOCIAL WORK - NSDCPETBCESMAN_GEN_ALL_CORE
If you are a smoker, it is important for your health to stop smoking. Please be aware that second hand smoke is also harmful. no redness/no discharge

## 2022-06-10 NOTE — ED ADULT NURSE NOTE - MUSCULOSKELETAL WDL
Full range of motion of upper and lower extremities, no joint tenderness/swelling.
verbal cues/1 person assist

## 2023-08-09 NOTE — BEHAVIORAL HEALTH ASSESSMENT NOTE - NSBHCONSORIP_PSY_A_CORE
Consult... Rinvoq Counseling: I discussed with the patient the risks of Rinvoq therapy including but not limited to upper respiratory tract infections, shingles, cold sores, bronchitis, nausea, cough, fever, acne, and headache. Live vaccines should be avoided.  This medication has been linked to serious infections; higher rate of mortality; malignancy and lymphoproliferative disorders; major adverse cardiovascular events; thrombosis; thrombocytopenia, anemia, and neutropenia; lipid elevations; liver enzyme elevations; and gastrointestinal perforations.

## 2024-12-18 RX ORDER — KETOCONAZOLE 2 %
1 CREAM (GRAM) TOPICAL
Refills: 0 | DISCHARGE
Start: 2024-12-18 | End: 2025-01-15

## 2024-12-23 ENCOUNTER — INPATIENT (INPATIENT)
Facility: HOSPITAL | Age: 69
LOS: 2 days | Discharge: NURSING, REHAB OR LONG-TERM CARE | DRG: 872 | End: 2024-12-26
Attending: INTERNAL MEDICINE | Admitting: INTERNAL MEDICINE
Payer: MEDICARE

## 2024-12-23 VITALS
OXYGEN SATURATION: 94 % | RESPIRATION RATE: 22 BRPM | SYSTOLIC BLOOD PRESSURE: 125 MMHG | WEIGHT: 221.79 LBS | TEMPERATURE: 102 F | HEART RATE: 116 BPM | DIASTOLIC BLOOD PRESSURE: 78 MMHG

## 2024-12-23 DIAGNOSIS — A41.9 SEPSIS, UNSPECIFIED ORGANISM: ICD-10-CM

## 2024-12-23 DIAGNOSIS — Z29.9 ENCOUNTER FOR PROPHYLACTIC MEASURES, UNSPECIFIED: ICD-10-CM

## 2024-12-23 DIAGNOSIS — E87.20 ACIDOSIS, UNSPECIFIED: ICD-10-CM

## 2024-12-23 DIAGNOSIS — I10 ESSENTIAL (PRIMARY) HYPERTENSION: ICD-10-CM

## 2024-12-23 DIAGNOSIS — F25.9 SCHIZOAFFECTIVE DISORDER, UNSPECIFIED: ICD-10-CM

## 2024-12-23 DIAGNOSIS — E03.9 HYPOTHYROIDISM, UNSPECIFIED: ICD-10-CM

## 2024-12-23 DIAGNOSIS — E78.5 HYPERLIPIDEMIA, UNSPECIFIED: ICD-10-CM

## 2024-12-23 DIAGNOSIS — K21.9 GASTRO-ESOPHAGEAL REFLUX DISEASE WITHOUT ESOPHAGITIS: ICD-10-CM

## 2024-12-23 LAB
ALBUMIN SERPL ELPH-MCNC: 3.5 G/DL — SIGNIFICANT CHANGE UP (ref 3.5–5)
ALP SERPL-CCNC: 109 U/L — SIGNIFICANT CHANGE UP (ref 40–120)
ALT FLD-CCNC: 23 U/L DA — SIGNIFICANT CHANGE UP (ref 10–60)
ANION GAP SERPL CALC-SCNC: 8 MMOL/L — SIGNIFICANT CHANGE UP (ref 5–17)
APTT BLD: 35.7 SEC — HIGH (ref 24.5–35.6)
AST SERPL-CCNC: 19 U/L — SIGNIFICANT CHANGE UP (ref 10–40)
BASOPHILS # BLD AUTO: 0.03 K/UL — SIGNIFICANT CHANGE UP (ref 0–0.2)
BASOPHILS NFR BLD AUTO: 0.2 % — SIGNIFICANT CHANGE UP (ref 0–2)
BILIRUB SERPL-MCNC: 0.2 MG/DL — SIGNIFICANT CHANGE UP (ref 0.2–1.2)
BUN SERPL-MCNC: 23 MG/DL — HIGH (ref 7–18)
CALCIUM SERPL-MCNC: 9.1 MG/DL — SIGNIFICANT CHANGE UP (ref 8.4–10.5)
CHLORIDE SERPL-SCNC: 108 MMOL/L — SIGNIFICANT CHANGE UP (ref 96–108)
CO2 SERPL-SCNC: 28 MMOL/L — SIGNIFICANT CHANGE UP (ref 22–31)
CREAT SERPL-MCNC: 1.05 MG/DL — SIGNIFICANT CHANGE UP (ref 0.5–1.3)
EGFR: 58 ML/MIN/1.73M2 — LOW
EOSINOPHIL # BLD AUTO: 0.09 K/UL — SIGNIFICANT CHANGE UP (ref 0–0.5)
EOSINOPHIL NFR BLD AUTO: 0.6 % — SIGNIFICANT CHANGE UP (ref 0–6)
FLUAV AG NPH QL: SIGNIFICANT CHANGE UP
FLUBV AG NPH QL: SIGNIFICANT CHANGE UP
GLUCOSE SERPL-MCNC: 152 MG/DL — HIGH (ref 70–99)
HCG SERPL-ACNC: <1 MIU/ML — SIGNIFICANT CHANGE UP
HCT VFR BLD CALC: 39.9 % — SIGNIFICANT CHANGE UP (ref 34.5–45)
HGB BLD-MCNC: 13 G/DL — SIGNIFICANT CHANGE UP (ref 11.5–15.5)
IMM GRANULOCYTES NFR BLD AUTO: 0.5 % — SIGNIFICANT CHANGE UP (ref 0–0.9)
INR BLD: 1.03 RATIO — SIGNIFICANT CHANGE UP (ref 0.85–1.16)
LACTATE SERPL-SCNC: 1.7 MMOL/L — SIGNIFICANT CHANGE UP (ref 0.7–2)
LACTATE SERPL-SCNC: 3 MMOL/L — HIGH (ref 0.7–2)
LIDOCAIN IGE QN: 20 U/L — SIGNIFICANT CHANGE UP (ref 13–75)
LYMPHOCYTES # BLD AUTO: 18.6 % — SIGNIFICANT CHANGE UP (ref 13–44)
LYMPHOCYTES # BLD AUTO: 2.75 K/UL — SIGNIFICANT CHANGE UP (ref 1–3.3)
MCHC RBC-ENTMCNC: 32.6 G/DL — SIGNIFICANT CHANGE UP (ref 32–36)
MCHC RBC-ENTMCNC: 32.9 PG — SIGNIFICANT CHANGE UP (ref 27–34)
MCV RBC AUTO: 101 FL — HIGH (ref 80–100)
MONOCYTES # BLD AUTO: 0.91 K/UL — HIGH (ref 0–0.9)
MONOCYTES NFR BLD AUTO: 6.2 % — SIGNIFICANT CHANGE UP (ref 2–14)
NEUTROPHILS # BLD AUTO: 10.93 K/UL — HIGH (ref 1.8–7.4)
NEUTROPHILS NFR BLD AUTO: 73.9 % — SIGNIFICANT CHANGE UP (ref 43–77)
NRBC # BLD: 0 /100 WBCS — SIGNIFICANT CHANGE UP (ref 0–0)
PLATELET # BLD AUTO: 263 K/UL — SIGNIFICANT CHANGE UP (ref 150–400)
POTASSIUM SERPL-MCNC: 3.8 MMOL/L — SIGNIFICANT CHANGE UP (ref 3.5–5.3)
POTASSIUM SERPL-SCNC: 3.8 MMOL/L — SIGNIFICANT CHANGE UP (ref 3.5–5.3)
PROT SERPL-MCNC: 8.2 G/DL — SIGNIFICANT CHANGE UP (ref 6–8.3)
PROTHROM AB SERPL-ACNC: 11.9 SEC — SIGNIFICANT CHANGE UP (ref 9.9–13.4)
RBC # BLD: 3.95 M/UL — SIGNIFICANT CHANGE UP (ref 3.8–5.2)
RBC # FLD: 14.4 % — SIGNIFICANT CHANGE UP (ref 10.3–14.5)
RSV RNA NPH QL NAA+NON-PROBE: SIGNIFICANT CHANGE UP
SARS-COV-2 RNA SPEC QL NAA+PROBE: SIGNIFICANT CHANGE UP
SODIUM SERPL-SCNC: 144 MMOL/L — SIGNIFICANT CHANGE UP (ref 135–145)
WBC # BLD: 14.79 K/UL — HIGH (ref 3.8–10.5)
WBC # FLD AUTO: 14.79 K/UL — HIGH (ref 3.8–10.5)

## 2024-12-23 PROCEDURE — 99285 EMERGENCY DEPT VISIT HI MDM: CPT

## 2024-12-23 PROCEDURE — 71045 X-RAY EXAM CHEST 1 VIEW: CPT | Mod: 26

## 2024-12-23 RX ORDER — DIVALPROEX SODIUM 500 MG/1
3 TABLET, DELAYED RELEASE ORAL
Refills: 0 | DISCHARGE

## 2024-12-23 RX ORDER — LEVOTHYROXINE SODIUM 175 UG/1
1 TABLET ORAL
Refills: 0 | DISCHARGE

## 2024-12-23 RX ORDER — ATORVASTATIN CALCIUM 40 MG/1
1 TABLET, FILM COATED ORAL
Refills: 0 | DISCHARGE

## 2024-12-23 RX ORDER — SERTRALINE HYDROCHLORIDE 25 MG/1
2 TABLET ORAL
Refills: 0 | DISCHARGE

## 2024-12-23 RX ORDER — ONDANSETRON 4 MG/1
4 TABLET ORAL EVERY 8 HOURS
Refills: 0 | Status: DISCONTINUED | OUTPATIENT
Start: 2024-12-23 | End: 2024-12-26

## 2024-12-23 RX ORDER — LEVOTHYROXINE SODIUM 175 UG/1
125 TABLET ORAL DAILY
Refills: 0 | Status: DISCONTINUED | OUTPATIENT
Start: 2024-12-23 | End: 2024-12-26

## 2024-12-23 RX ORDER — B COMPLEX, C NO.20/FOLIC ACID 1 MG
1 CAPSULE ORAL
Refills: 0 | DISCHARGE

## 2024-12-23 RX ORDER — HEPARIN SODIUM 1000 [USP'U]/ML
5000 INJECTION, SOLUTION INTRAVENOUS; SUBCUTANEOUS EVERY 12 HOURS
Refills: 0 | Status: DISCONTINUED | OUTPATIENT
Start: 2024-12-23 | End: 2024-12-23

## 2024-12-23 RX ORDER — AMANTADINE HYDROCHLORIDE 100 MG/1
100 CAPSULE ORAL DAILY
Refills: 0 | Status: DISCONTINUED | OUTPATIENT
Start: 2024-12-23 | End: 2024-12-26

## 2024-12-23 RX ORDER — AMANTADINE HYDROCHLORIDE 100 MG/1
1 CAPSULE ORAL
Refills: 0 | DISCHARGE

## 2024-12-23 RX ORDER — LORAZEPAM 1 MG/1
0.5 TABLET ORAL EVERY 12 HOURS
Refills: 0 | Status: DISCONTINUED | OUTPATIENT
Start: 2024-12-23 | End: 2024-12-26

## 2024-12-23 RX ORDER — LORAZEPAM 1 MG/1
1 TABLET ORAL
Refills: 0 | DISCHARGE

## 2024-12-23 RX ORDER — ACETAMINOPHEN 80 MG/.8ML
650 SOLUTION/ DROPS ORAL EVERY 6 HOURS
Refills: 0 | Status: DISCONTINUED | OUTPATIENT
Start: 2024-12-23 | End: 2024-12-26

## 2024-12-23 RX ORDER — DIVALPROEX SODIUM 500 MG/1
1 TABLET, DELAYED RELEASE ORAL
Refills: 0 | DISCHARGE

## 2024-12-23 RX ORDER — FERROUS SULFATE 325(65) MG
1 TABLET ORAL
Refills: 0 | DISCHARGE

## 2024-12-23 RX ORDER — ACETAMINOPHEN 80 MG/.8ML
975 SOLUTION/ DROPS ORAL ONCE
Refills: 0 | Status: COMPLETED | OUTPATIENT
Start: 2024-12-23 | End: 2024-12-23

## 2024-12-23 RX ORDER — SODIUM CHLORIDE 9 MG/ML
2000 INJECTION, SOLUTION INTRAMUSCULAR; INTRAVENOUS; SUBCUTANEOUS ONCE
Refills: 0 | Status: COMPLETED | OUTPATIENT
Start: 2024-12-23 | End: 2024-12-23

## 2024-12-23 RX ORDER — ASPIRIN 81 MG
1 TABLET, DELAYED RELEASE (ENTERIC COATED) ORAL
Refills: 0 | DISCHARGE

## 2024-12-23 RX ORDER — ASPIRIN 81 MG
81 TABLET, DELAYED RELEASE (ENTERIC COATED) ORAL DAILY
Refills: 0 | Status: DISCONTINUED | OUTPATIENT
Start: 2024-12-23 | End: 2024-12-26

## 2024-12-23 RX ORDER — DEXAMETHASONE SODIUM PHOSPHATE 4 MG/ML
10 VIAL (ML) INJECTION ONCE
Refills: 0 | Status: COMPLETED | OUTPATIENT
Start: 2024-12-23 | End: 2024-12-23

## 2024-12-23 RX ORDER — ENOXAPARIN SODIUM 60 MG/.6ML
40 INJECTION INTRAVENOUS; SUBCUTANEOUS EVERY 24 HOURS
Refills: 0 | Status: DISCONTINUED | OUTPATIENT
Start: 2024-12-23 | End: 2024-12-26

## 2024-12-23 RX ORDER — DIPHENHYDRAMINE HCL 25 MG
25 TABLET ORAL ONCE
Refills: 0 | Status: COMPLETED | OUTPATIENT
Start: 2024-12-23 | End: 2024-12-23

## 2024-12-23 RX ORDER — DIVALPROEX SODIUM 500 MG/1
500 TABLET, DELAYED RELEASE ORAL
Refills: 0 | Status: DISCONTINUED | OUTPATIENT
Start: 2024-12-23 | End: 2024-12-26

## 2024-12-23 RX ORDER — ATORVASTATIN CALCIUM 40 MG/1
10 TABLET, FILM COATED ORAL AT BEDTIME
Refills: 0 | Status: DISCONTINUED | OUTPATIENT
Start: 2024-12-23 | End: 2024-12-26

## 2024-12-23 RX ORDER — GINKGO BILOBA 40 MG
1 CAPSULE ORAL
Refills: 0 | DISCHARGE

## 2024-12-23 RX ORDER — GINKGO BILOBA 40 MG
3 CAPSULE ORAL AT BEDTIME
Refills: 0 | Status: DISCONTINUED | OUTPATIENT
Start: 2024-12-23 | End: 2024-12-26

## 2024-12-23 RX ORDER — SERTRALINE HYDROCHLORIDE 25 MG/1
200 TABLET ORAL DAILY
Refills: 0 | Status: DISCONTINUED | OUTPATIENT
Start: 2024-12-23 | End: 2024-12-26

## 2024-12-23 RX ORDER — FERROUS SULFATE 325(65) MG
325 TABLET ORAL DAILY
Refills: 0 | Status: DISCONTINUED | OUTPATIENT
Start: 2024-12-23 | End: 2024-12-26

## 2024-12-23 RX ORDER — DIVALPROEX SODIUM 500 MG/1
750 TABLET, DELAYED RELEASE ORAL AT BEDTIME
Refills: 0 | Status: DISCONTINUED | OUTPATIENT
Start: 2024-12-23 | End: 2024-12-26

## 2024-12-23 RX ORDER — B COMPLEX, C NO.20/FOLIC ACID 1 MG
1 CAPSULE ORAL DAILY
Refills: 0 | Status: DISCONTINUED | OUTPATIENT
Start: 2024-12-23 | End: 2024-12-26

## 2024-12-23 RX ORDER — OLANZAPINE 15 MG/1
20 TABLET ORAL
Refills: 0 | Status: DISCONTINUED | OUTPATIENT
Start: 2024-12-23 | End: 2024-12-26

## 2024-12-23 RX ORDER — FAMOTIDINE 20 MG/1
20 TABLET, FILM COATED ORAL ONCE
Refills: 0 | Status: COMPLETED | OUTPATIENT
Start: 2024-12-23 | End: 2024-12-23

## 2024-12-23 RX ORDER — OLANZAPINE 15 MG/1
1 TABLET ORAL
Refills: 0 | DISCHARGE

## 2024-12-23 RX ADMIN — ATORVASTATIN CALCIUM 10 MILLIGRAM(S): 40 TABLET, FILM COATED ORAL at 21:38

## 2024-12-23 RX ADMIN — Medication 3 MILLIGRAM(S): at 21:49

## 2024-12-23 RX ADMIN — SODIUM CHLORIDE 2000 MILLILITER(S): 9 INJECTION, SOLUTION INTRAMUSCULAR; INTRAVENOUS; SUBCUTANEOUS at 11:34

## 2024-12-23 RX ADMIN — OLANZAPINE 20 MILLIGRAM(S): 15 TABLET ORAL at 21:39

## 2024-12-23 RX ADMIN — ACETAMINOPHEN 975 MILLIGRAM(S): 80 SOLUTION/ DROPS ORAL at 11:29

## 2024-12-23 RX ADMIN — ENOXAPARIN SODIUM 40 MILLIGRAM(S): 60 INJECTION INTRAVENOUS; SUBCUTANEOUS at 21:38

## 2024-12-23 RX ADMIN — DIVALPROEX SODIUM 750 MILLIGRAM(S): 500 TABLET, DELAYED RELEASE ORAL at 21:39

## 2024-12-23 RX ADMIN — Medication 25 MILLIGRAM(S): at 11:31

## 2024-12-23 RX ADMIN — Medication 102 MILLIGRAM(S): at 11:32

## 2024-12-23 RX ADMIN — FAMOTIDINE 20 MILLIGRAM(S): 20 TABLET, FILM COATED ORAL at 11:31

## 2024-12-23 NOTE — ED ADULT TRIAGE NOTE - CHIEF COMPLAINT QUOTE
patient sent from nursing home for allergic reaction to hair dye x 2 days ago, patient complaining of itching on head and noted with rashes on scalp, denies any shortness of breath, noted with low oxygen on the field 90% RA

## 2024-12-23 NOTE — H&P ADULT - HISTORY OF PRESENT ILLNESS
70 yo F, from [facility],  with PMH of Bipolar disorder, Schizoaffective disorder, Anxiety, Depression HTN, Hypothyroidism, Neurologic bladder, Anemia, GERD, Cervical myelopathy, Vitiligo present with subjective fever, chills,  body aches, nausea, vomiting ( 1 episode, NBNB) that started today. Patient  reports she felt very hot and cold today when she woke up. Patient denies any cough, nasal congestion, chest pain, SOB, abdominal pain, constipation, diarrhea, urinary symptoms.\    In the ED,   v/s: 128/74, 108, 101.5, 95%  RA  Labs: WBC 14k, Lactate 3 > 1.7   RSV/FLU/COVID; negative   EK BPM sinus tachycardia   CXR: clear (prelim)  s/p Tylenol, Decardron 10mg, Benadryl, Famotidine, Levaquin 750  s/p 2 L NS bolus   70 yo F, from [facility], with PMH of Bipolar disorder, Schizoaffective disorder, Anxiety, Depression HTN, Hypothyroidism, Neurologic bladder, Anemia, GERD, Cervical myelopathy, Vitiligo present with subjective fever, chills,  body aches, nausea, vomiting ( 1 episode, NBNB) that started today. Accompanied with 2 days history of itchy scalp and redness after using hair dye.  Patient  reports she felt very hot and cold today when she woke up this morning . Patient denies any cough, nasal congestion, chest pain, SOB, abdominal pain, constipation, diarrhea, urinary symptoms.    In the ED,   v/s: 128/74, 108, 101.5, 95%  RA  Labs: WBC 14k, Lactate 3 > 1.7   RSV/FLU/COVID; negative   EK BPM sinus tachycardia   CXR:  negative   s/p Tylenol, Decadron 10mg, Benadryl, Famotidine, Levaquin 750  s/p 2 L NS bolus

## 2024-12-23 NOTE — H&P ADULT - PROBLEM SELECTOR PLAN 5
Hx of HLD on Atorvastatin 10 mg QD  - c/w home med Hx of Anxiety & depression, Lorazepam 0.5 mg BID, sertraline 200mg QD  - c/w home med

## 2024-12-23 NOTE — H&P ADULT - ATTENDING COMMENTS
70 yo F, from [facility] [address], with PMH of Bipolar disorder, Schizoaffective disorder, Anxiety, Depression HTN, Hypothyroidism, Neurologic bladder, Anemia, GERD, Cervical myelopathy, Vitiligo present with subjective fever, chills,  body aches, nausea, vomiting ( 1 episode, NBNB) that started today. Accompanied with 2 days history of itchy scalp and redness after using hair dye.  Patient  reports she felt very hot and cold today when she woke up this morning . Patient denies any cough, nasal congestion, chest pain, SOB, abdominal pain, constipation, diarrhea, urinary symptoms.    In the ED,   v/s: 128/74, 108, 101.5, 95%  RA  Labs: WBC 14k, Lactate 3 > 1.7   RSV/FLU/COVID; negative   EK BPM sinus tachycardia   CXR:  negative   s/p Tylenol, Decadron 10mg, Benadryl, Famotidine, Levaquin 750  s/p 2 L NS bolus    # HYPOTENSION S/T IVVD [FROM VOMITING] VS. SEPSIS  - ETIOLOGY UNCLEAR  # ALLERGIC REACTION TO ? HAIRDYE  - IV FLUIDS  - NOTED CXR, NOTED RVP  - F/U UA  - S/P DECADRON, BENADRYL, FAMOTIDINE  - NO ORAL INVOLVEMENT, NO SWELLING - WILL MONITOR  - HOLD ABX FOR NOW  - MONITOR BLOOD CULTURE  - WILL GIVE TOPICAL TRX FOR RASH    # IMPAIRED GAIT S/T OA, CERVICAL AND LS RADICULOPATHY  - F/U PT EVAL    # BIPOLAR DISORDER, SCHIZOAFFECTIVE DISORDER, ANXIETY, DEPRESSION    # HYPOTHYROIDISM  # HTN  # NEUROLOGIC BLADDER

## 2024-12-23 NOTE — H&P ADULT - PROBLEM SELECTOR PLAN 1
present with subjective fever, chills,  body aches, nausea, vomiting ( 1 episode, NBNB) that started today  v/s: 128/74, 108, 101.5, 95%  RA  Labs: WBC 14k, Lactate 3 > 1.7   RSV/FLU/COVID; negative   EK BPM sinus tachycardia   CXR: clear (prelim)  s/p Tylenol, Decadron 10mg, Benadryl, Famotidine, Levaquin 750  s/p 2 L NS bolus  - Started on   - Trend lactate  - Tylenol prn  - f/u Ucx, Bcx present with subjective fever, chills,  body aches, nausea, vomiting ( 1 episode, NBNB) that started today  v/s: 128/74, 108, 101.5, 95%  RA  Labs: WBC 14k, Lactate 3 > 1.7   RSV/FLU/COVID; negative   EK BPM sinus tachycardia   CXR: negative  s/p Tylenol, Decadron 10mg, Benadryl, Famotidine, Levaquin 750  s/p 2 L NS bolus  - Tylenol prn  - f/u Urinalysis  Bcx

## 2024-12-23 NOTE — H&P ADULT - PROBLEM SELECTOR PLAN 2
Hx od Schizoaffective disorder, on amantadine 100 mg QD, Olanzapine 20mg BID   - c/w home med Lactate 3 on admission  s/p 2 L NS bolus   Repeat Lactate 1.7   RESOLVED

## 2024-12-23 NOTE — ED PROVIDER NOTE - OBJECTIVE STATEMENT
69 yr old female with hx of schizoaffective d/o, vitiligo presents to ed c/o itchy scalp and redness after using same hair dye 2 days ago. pt denies any other sx but was found febrile in ed. admits missing her friend who she hasn't seen in 10 yrs, no si or hi. + not feeling well.

## 2024-12-23 NOTE — H&P ADULT - PROBLEM SELECTOR PLAN 4
Hx of Anxiety & depression, LORazepam 0.5 mg BID, sertraline 200mg QD  - c/w home med Hx of Bipolar disorder, on divalproex sodium 250 mg 3 tabs QHS, divalproex sodium 500 mg QD  - c/w home med

## 2024-12-23 NOTE — PATIENT PROFILE ADULT - FALL HARM RISK - HARM RISK INTERVENTIONS

## 2024-12-23 NOTE — ED ADULT TRIAGE NOTE - WEIGHT IN KG
100.6 Cimzia Pregnancy And Lactation Text: This medication crosses the placenta but can be considered safe in certain situations. Cimzia may be excreted in breast milk.

## 2024-12-23 NOTE — ED PROVIDER NOTE - CONSTITUTIONAL, MLM
Well appearing, awake, alert, oriented to person, place, time/situation and in no apparent distress. obese normal...

## 2024-12-23 NOTE — H&P ADULT - PROBLEM SELECTOR PLAN 3
Hx of Bipolar disorder, on divalproex sodium 250 mg 3 tabs QHS, divalproex sodium 500 mg QD  - c/w home med Hx od Schizoaffective disorder, on amantadine 100 mg QD, Olanzapine 20mg BID   - c/w home med

## 2024-12-23 NOTE — H&P ADULT - NSHPPHYSICALEXAM_GEN_ALL_CORE
T(F): 98.1 (23 Dec 2024 13:32), Max: 101.6 (23 Dec 2024 10:25)  HR: 98 (23 Dec 2024 13:32)  BP: 137/77 (23 Dec 2024 13:32)  RR: 18 (23 Dec 2024 13:32)  SpO2: 95% (23 Dec 2024 13:32) (94% - 95%)  temp max in last 48H T(F): , Max: 101.6 (12-23-24 @ 10:25)        GENERAL: NAD, lying in bed comfortably   HEAD:  Atraumatic, Normocephalic  EYES: clear conjunctiva and  sclera   ENT: Moist mucous membranes  NECK: Supple  LUNG: Clear to auscultation bilaterally. No rales, wheezing,   HEART: Regular rate and rhythm; No murmurs,  CHEST WALL: non tenderness    ABDOMEN: Bowel sounds present; Soft, Nontender, Nondistended,   EXTREMITIES:  2+ Peripheral Pulses, . No clubbing, cyanosis, or edema  NERVOUS SYSTEM:  AAOX3, speech clear. No deficits   SKIN: Hypopigmentation noted on the face T(F): 98.1 (23 Dec 2024 13:32), Max: 101.6 (23 Dec 2024 10:25)  HR: 98 (23 Dec 2024 13:32)  BP: 137/77 (23 Dec 2024 13:32)  RR: 18 (23 Dec 2024 13:32)  SpO2: 95% (23 Dec 2024 13:32) (94% - 95%)  temp max in last 48H T(F): , Max: 101.6 (12-23-24 @ 10:25)        GENERAL: NAD, lying in bed comfortably   HEAD:  Atraumatic, Normocephalic  EYES: clear conjunctiva and  sclera   ENT: Moist mucous membranes  NECK: Supple  LUNG: Clear to auscultation bilaterally. No rales, wheezing,   HEART: Regular rate and rhythm; No murmurs,  CHEST WALL: non tenderness    ABDOMEN: Bowel sounds present; Soft, Nontender, Nondistended,   EXTREMITIES:  2+ Peripheral Pulses, . No clubbing, cyanosis, or edema  NERVOUS SYSTEM:  AAOX3, speech clear. No deficits   SKIN: Hypopigmentation noted on the face and red spot noted on the forehead

## 2024-12-23 NOTE — ED ADULT NURSE NOTE - OBJECTIVE STATEMENT
pt BIBA. pt reports rash on face and head from using hair dye x 2 days ago. pt endorses itchiness. pt BIBA , pt reports rash on face and head from using hair dye x 2 days ago. pt endorses itchiness. code sepsis called on arrival.

## 2024-12-23 NOTE — ED PROVIDER NOTE - CLINICAL SUMMARY MEDICAL DECISION MAKING FREE TEXT BOX
69 yr old female with hx of schizoaffective d/o, vitiligo presents to ed c/o itchy scalp and redness after using same hair dye 2 days ago. pt denies any other sx but was found febrile in ed. admits missing her friend who she hasn't seen in 10 yrs, no si or hi. + not feeling well.    sepsis- viral? allergic reaction- meds, sepsis work up and admission? abc intact otherwise

## 2024-12-23 NOTE — H&P ADULT - ASSESSMENT
68 yo F, from [facility],  with PMH of Bipolar disorder, Schizoaffective disorder, Anxiety, Depression HTN, Hypothyroidism, Neurologic bladder, Anemia, GERD, Cervical myelopathy, Vitiligo present with subjective fever, chills,  body aches, nausea, vomiting ( 1 episode, NBNB) that started today.. Admitted to medicine for sepsis     In the ED,   v/s: 128/74, 108, 101.5, 95%  RA  Labs: WBC 14k, Lactate 3 > 1.7   RSV/FLU/COVID; negative   EK BPM sinus tachycardia   CXR: clear (prelim)  s/p Tylenol, Decardron 10mg, Benadryl, Famotidine, Levaquin 750  s/p 2 L NS bolus 70 yo F, from [facility], with PMH of Bipolar disorder, Schizoaffective disorder, Anxiety, Depression,  HTN, Hypothyroidism, Neurologic bladder, Anemia, GERD, Cervical myelopathy, Vitiligo present with subjective fever, chills,  body aches, nausea, vomiting ( 1 episode, NBNB) that started today. Accompanied with 2 days history of itchy scalp and redness after using hair dye. Admitted to medicine for sepsis, lactic acidosis

## 2024-12-23 NOTE — ED ADULT NURSE NOTE - NSFALLRISKINTERV_ED_ALL_ED

## 2024-12-23 NOTE — ED PROVIDER NOTE - ENMT, MLM
Airway patent, Nasal mucosa clear. Mouth with normal mucosa. Throat has no vesicles, no oropharyngeal exudates and uvula is midline. no ulcers

## 2024-12-23 NOTE — PHARMACOTHERAPY INTERVENTION NOTE - COMMENTS
Patient is from [facility] and their medical record was used to update the outpatient medication review.

## 2024-12-23 NOTE — H&P ADULT - NSHPREVIEWOFSYSTEMS_GEN_ALL_CORE
REVIEW OF SYSTEMS:  CONSTITUTIONAL: + fevers, chills  EYES: No conjunctiva redness, No eye discharge  ENT: No nasal congestion, No sore throat, No ear pain,   NECK: No pain or stiffness  RESPIRATORY: No cough, SOB,  wheezing, hemoptysis  CARDIOVASCULAR: No chest pain or  palpitations  GASTROINTESTINAL: + nausea, vomiting No abdominal pain. diarrhea or constipation.  GENITOURINARY: No dysuria, frequency or hematuria  NEUROLOGICAL: No headache,  SKIN: No itching, rashes,   All other review of systems is negative unless indicated above.

## 2024-12-24 DIAGNOSIS — L24.9 IRRITANT CONTACT DERMATITIS, UNSPECIFIED CAUSE: ICD-10-CM

## 2024-12-24 DIAGNOSIS — L29.9 PRURITUS, UNSPECIFIED: ICD-10-CM

## 2024-12-24 DIAGNOSIS — L98.9 DISORDER OF THE SKIN AND SUBCUTANEOUS TISSUE, UNSPECIFIED: ICD-10-CM

## 2024-12-24 DIAGNOSIS — E87.6 HYPOKALEMIA: ICD-10-CM

## 2024-12-24 DIAGNOSIS — Z75.8 OTHER PROBLEMS RELATED TO MEDICAL FACILITIES AND OTHER HEALTH CARE: ICD-10-CM

## 2024-12-24 LAB
ALBUMIN SERPL ELPH-MCNC: 2.8 G/DL — LOW (ref 3.5–5)
ALP SERPL-CCNC: 78 U/L — SIGNIFICANT CHANGE UP (ref 40–120)
ALT FLD-CCNC: 19 U/L DA — SIGNIFICANT CHANGE UP (ref 10–60)
ANION GAP SERPL CALC-SCNC: 6 MMOL/L — SIGNIFICANT CHANGE UP (ref 5–17)
APPEARANCE UR: CLEAR — SIGNIFICANT CHANGE UP
AST SERPL-CCNC: 12 U/L — SIGNIFICANT CHANGE UP (ref 10–40)
BILIRUB SERPL-MCNC: 0.2 MG/DL — SIGNIFICANT CHANGE UP (ref 0.2–1.2)
BILIRUB UR-MCNC: NEGATIVE — SIGNIFICANT CHANGE UP
BUN SERPL-MCNC: 19 MG/DL — HIGH (ref 7–18)
CALCIUM SERPL-MCNC: 8.6 MG/DL — SIGNIFICANT CHANGE UP (ref 8.4–10.5)
CHLORIDE SERPL-SCNC: 110 MMOL/L — HIGH (ref 96–108)
CO2 SERPL-SCNC: 26 MMOL/L — SIGNIFICANT CHANGE UP (ref 22–31)
COLOR SPEC: YELLOW — SIGNIFICANT CHANGE UP
CREAT SERPL-MCNC: 0.69 MG/DL — SIGNIFICANT CHANGE UP (ref 0.5–1.3)
DIFF PNL FLD: NEGATIVE — SIGNIFICANT CHANGE UP
EGFR: 95 ML/MIN/1.73M2 — SIGNIFICANT CHANGE UP
GLUCOSE BLDC GLUCOMTR-MCNC: 122 MG/DL — HIGH (ref 70–99)
GLUCOSE SERPL-MCNC: 105 MG/DL — HIGH (ref 70–99)
GLUCOSE UR QL: NEGATIVE MG/DL — SIGNIFICANT CHANGE UP
HCT VFR BLD CALC: 35.2 % — SIGNIFICANT CHANGE UP (ref 34.5–45)
HGB BLD-MCNC: 11.4 G/DL — LOW (ref 11.5–15.5)
KETONES UR-MCNC: ABNORMAL MG/DL
LEUKOCYTE ESTERASE UR-ACNC: NEGATIVE — SIGNIFICANT CHANGE UP
MAGNESIUM SERPL-MCNC: 2.5 MG/DL — SIGNIFICANT CHANGE UP (ref 1.6–2.6)
MCHC RBC-ENTMCNC: 32.4 G/DL — SIGNIFICANT CHANGE UP (ref 32–36)
MCHC RBC-ENTMCNC: 32.5 PG — SIGNIFICANT CHANGE UP (ref 27–34)
MCV RBC AUTO: 100.3 FL — HIGH (ref 80–100)
MRSA PCR RESULT.: SIGNIFICANT CHANGE UP
NITRITE UR-MCNC: NEGATIVE — SIGNIFICANT CHANGE UP
NRBC # BLD: 0 /100 WBCS — SIGNIFICANT CHANGE UP (ref 0–0)
PH UR: 5.5 — SIGNIFICANT CHANGE UP (ref 5–8)
PHOSPHATE SERPL-MCNC: 2.7 MG/DL — SIGNIFICANT CHANGE UP (ref 2.5–4.5)
PLATELET # BLD AUTO: 231 K/UL — SIGNIFICANT CHANGE UP (ref 150–400)
POTASSIUM SERPL-MCNC: 3.3 MMOL/L — LOW (ref 3.5–5.3)
POTASSIUM SERPL-SCNC: 3.3 MMOL/L — LOW (ref 3.5–5.3)
PROT SERPL-MCNC: 6.9 G/DL — SIGNIFICANT CHANGE UP (ref 6–8.3)
PROT UR-MCNC: NEGATIVE MG/DL — SIGNIFICANT CHANGE UP
RBC # BLD: 3.51 M/UL — LOW (ref 3.8–5.2)
RBC # FLD: 14.6 % — HIGH (ref 10.3–14.5)
S AUREUS DNA NOSE QL NAA+PROBE: DETECTED
SODIUM SERPL-SCNC: 142 MMOL/L — SIGNIFICANT CHANGE UP (ref 135–145)
SP GR SPEC: 1.03 — SIGNIFICANT CHANGE UP (ref 1–1.03)
UROBILINOGEN FLD QL: 1 MG/DL — SIGNIFICANT CHANGE UP (ref 0.2–1)
WBC # BLD: 13.79 K/UL — HIGH (ref 3.8–10.5)
WBC # FLD AUTO: 13.79 K/UL — HIGH (ref 3.8–10.5)

## 2024-12-24 PROCEDURE — 90792 PSYCH DIAG EVAL W/MED SRVCS: CPT

## 2024-12-24 RX ORDER — CIPROFLOXACIN 3 MG/ML
1 SOLUTION OPHTHALMIC
Refills: 0 | Status: DISCONTINUED | OUTPATIENT
Start: 2024-12-24 | End: 2024-12-26

## 2024-12-24 RX ORDER — HYDROCORTISONE 1 %
1 CREAM (GRAM) TOPICAL DAILY
Refills: 0 | Status: DISCONTINUED | OUTPATIENT
Start: 2024-12-24 | End: 2024-12-26

## 2024-12-24 RX ORDER — POTASSIUM CHLORIDE 600 MG/1
40 TABLET, FILM COATED, EXTENDED RELEASE ORAL ONCE
Refills: 0 | Status: COMPLETED | OUTPATIENT
Start: 2024-12-24 | End: 2024-12-24

## 2024-12-24 RX ADMIN — OLANZAPINE 20 MILLIGRAM(S): 15 TABLET ORAL at 10:12

## 2024-12-24 RX ADMIN — LORAZEPAM 0.5 MILLIGRAM(S): 1 TABLET ORAL at 05:42

## 2024-12-24 RX ADMIN — Medication 325 MILLIGRAM(S): at 11:49

## 2024-12-24 RX ADMIN — LEVOTHYROXINE SODIUM 125 MICROGRAM(S): 175 TABLET ORAL at 05:41

## 2024-12-24 RX ADMIN — ENOXAPARIN SODIUM 40 MILLIGRAM(S): 60 INJECTION INTRAVENOUS; SUBCUTANEOUS at 21:39

## 2024-12-24 RX ADMIN — ATORVASTATIN CALCIUM 10 MILLIGRAM(S): 40 TABLET, FILM COATED ORAL at 21:35

## 2024-12-24 RX ADMIN — Medication 1 TABLET(S): at 11:49

## 2024-12-24 RX ADMIN — DIVALPROEX SODIUM 500 MILLIGRAM(S): 500 TABLET, DELAYED RELEASE ORAL at 05:41

## 2024-12-24 RX ADMIN — DIVALPROEX SODIUM 750 MILLIGRAM(S): 500 TABLET, DELAYED RELEASE ORAL at 21:37

## 2024-12-24 RX ADMIN — Medication 3 MILLIGRAM(S): at 21:36

## 2024-12-24 RX ADMIN — OLANZAPINE 20 MILLIGRAM(S): 15 TABLET ORAL at 21:35

## 2024-12-24 RX ADMIN — SERTRALINE HYDROCHLORIDE 200 MILLIGRAM(S): 25 TABLET ORAL at 11:49

## 2024-12-24 RX ADMIN — LORAZEPAM 0.5 MILLIGRAM(S): 1 TABLET ORAL at 19:22

## 2024-12-24 RX ADMIN — AMANTADINE HYDROCHLORIDE 100 MILLIGRAM(S): 100 CAPSULE ORAL at 11:53

## 2024-12-24 RX ADMIN — POTASSIUM CHLORIDE 40 MILLIEQUIVALENT(S): 600 TABLET, FILM COATED, EXTENDED RELEASE ORAL at 10:06

## 2024-12-24 RX ADMIN — Medication 81 MILLIGRAM(S): at 11:49

## 2024-12-24 NOTE — PROGRESS NOTE ADULT - ASSESSMENT
70 yo F, from [facility], with PMH of Bipolar disorder, Schizoaffective disorder, Anxiety, Depression,  HTN, Hypothyroidism, Neurologic bladder, Anemia, GERD, Cervical myelopathy, Vitiligo present with subjective fever, chills,  body aches, nausea, vomiting ( 1 episode, NBNB)  x1 day. Accompanied with 2 days history of itchy scalp and redness after using hair dye. Admitted to medicine for sepsis, lactic acidosis.   Blood cultures in progress from 12/23. UA and CXR negative for infection so far.   Psych Dr. Taveras was consulted for assistance with medication management.  ID Dr. Briscoe also consulted.   Patient will need PT eval for dispo planning.

## 2024-12-24 NOTE — BH CONSULTATION LIAISON ASSESSMENT NOTE - HPI (INCLUDE ILLNESS QUALITY, SEVERITY, DURATION, TIMING, CONTEXT, MODIFYING FACTORS, ASSOCIATED SIGNS AND SYMPTOMS)
70 y/o F with a hx of Schizoaffective disorder, bipolar type, HTN, hypothyroidism, neurogenic bladder, GERD, cervical myelopathy, and vitiligo, who is admitted due to suspected sepsis and a rash. She is consulted for medication management and r/o catatonia. As per team, the patient was minimally engaging yesterday. Today patient was found awake, alert and fully oriented. She reports that she was admitted because of a rash on her face. Denies any worsening or mood or anxiety symptoms. Says that her sleep and appetite have been ok. She had no staring, grimacing, catalepsy, waxy flexibility, posturing, mannerisms, stereotypy, echolalia, echopraxia, mitgehen or gegenhalten. She denies any SI, HI or AVH.

## 2024-12-24 NOTE — PROGRESS NOTE ADULT - PROBLEM SELECTOR PLAN 1
s/p hair dye 2 days ago on 12/22 now with possible allergic reaction  add hydrocortisone 2.5% lotion daily to scalp  monitor for improvement

## 2024-12-24 NOTE — BH CONSULTATION LIAISON ASSESSMENT NOTE - DESCRIPTION
From [place]. Has lived at [facility] for almost 25 years. Single, no children.

## 2024-12-24 NOTE — BH CONSULTATION LIAISON ASSESSMENT NOTE - NSICDXBHSECONDARYDX_PSY_ALL_CORE
Sepsis   A41.9  HTN (hypertension)   I10  Schizoaffective disorder   F25.9  Hypothyroidism   E03.9  Prophylactic measure   Z29.9  Bipolar disorder   F31.9  Anxiety and depression   F41.9  HLD (hyperlipidemia)   E78.5  Lactic acidosis   E87.20

## 2024-12-24 NOTE — BH CONSULTATION LIAISON ASSESSMENT NOTE - NSBHCHARTREVIEWVS_PSY_A_CORE FT
Vital Signs Last 24 Hrs  T(C): 36.4 (24 Dec 2024 12:55), Max: 36.8 (23 Dec 2024 21:48)  T(F): 97.5 (24 Dec 2024 12:55), Max: 98.3 (23 Dec 2024 21:48)  HR: 78 (24 Dec 2024 12:55) (69 - 84)  BP: 132/76 (24 Dec 2024 12:55) (118/67 - 132/76)  BP(mean): --  RR: 18 (24 Dec 2024 12:55) (16 - 20)  SpO2: 95% (24 Dec 2024 12:55) (95% - 97%)    Parameters below as of 24 Dec 2024 12:55  Patient On (Oxygen Delivery Method): room air

## 2024-12-24 NOTE — PROGRESS NOTE ADULT - PROBLEM SELECTOR PLAN 3
present with subjective fever, chills,  body aches, nausea, vomiting ( 1 episode, NBNB) x1 day  v/s: 128/74, 108, 101.5, 95%  RA  Labs: WBC 14k, Lactate 3 > 1.7   RSV/FLU/COVID; negative   EK BPM sinus tachycardia   CXR: negative  UA neg  s/p Tylenol, Decadron 10mg, Benadryl, Famotidine, Levaquin 750 mg  s/p 2 L NS bolus  - Tylenol prn  - f/u Bcx from

## 2024-12-24 NOTE — BH CONSULTATION LIAISON ASSESSMENT NOTE - NSBHCHARTREVIEWLAB_PSY_A_CORE FT
CBC Full  -  ( 24 Dec 2024 06:24 )  WBC Count : 13.79 K/uL  RBC Count : 3.51 M/uL  Hemoglobin : 11.4 g/dL  Hematocrit : 35.2 %  Platelet Count - Automated : 231 K/uL  Mean Cell Volume : 100.3 fl  Mean Cell Hemoglobin : 32.5 pg  Mean Cell Hemoglobin Concentration : 32.4 g/dL  Auto Neutrophil # : x  Auto Lymphocyte # : x  Auto Monocyte # : x  Auto Eosinophil # : x  Auto Basophil # : x  Auto Neutrophil % : x  Auto Lymphocyte % : x  Auto Monocyte % : x  Auto Eosinophil % : x  Auto Basophil % : x  12-24    142  |  110[H]  |  19[H]  ----------------------------<  105[H]  3.3[L]   |  26  |  0.69    Ca    8.6      24 Dec 2024 06:24  Phos  2.7     12-24  Mg     2.5     12-24    TPro  6.9  /  Alb  2.8[L]  /  TBili  0.2  /  DBili  x   /  AST  12  /  ALT  19  /  AlkPhos  78  12-24

## 2024-12-24 NOTE — BH CONSULTATION LIAISON ASSESSMENT NOTE - CURRENT MEDICATION
MEDICATIONS  (STANDING):  amantadine 100 milliGRAM(s) Oral daily  aspirin enteric coated 81 milliGRAM(s) Oral daily  atorvastatin 10 milliGRAM(s) Oral at bedtime  divalproex  milliGRAM(s) Oral at bedtime  divalproex  milliGRAM(s) Oral <User Schedule>  enoxaparin Injectable 40 milliGRAM(s) SubCutaneous every 24 hours  ferrous    sulfate 325 milliGRAM(s) Oral daily  levothyroxine 125 MICROGram(s) Oral daily  LORazepam     Tablet 0.5 milliGRAM(s) Oral every 12 hours  melatonin 3 milliGRAM(s) Oral at bedtime  multivitamin 1 Tablet(s) Oral daily  OLANZapine 20 milliGRAM(s) Oral <User Schedule>  sertraline 200 milliGRAM(s) Oral daily    MEDICATIONS  (PRN):  acetaminophen     Tablet .. 650 milliGRAM(s) Oral every 6 hours PRN Temp greater or equal to 38C (100.4F), Mild Pain (1 - 3)  ondansetron Injectable 4 milliGRAM(s) IV Push every 8 hours PRN Nausea and/or Vomiting

## 2024-12-24 NOTE — BH CONSULTATION LIAISON ASSESSMENT NOTE - NSBHCHARTREVIEWINVESTIGATE_PSY_A_CORE FT
< from: 12 Lead ECG (12.23.24 @ 10:48) >    Ventricular Rate 111 BPM    Atrial Rate 111 BPM    P-R Interval 136 ms    QRS Duration 74 ms    Q-T Interval 320 ms    QTC Calculation(Bazett) 435 ms    P Axis 78 degrees    R Axis 49 degrees    T Axis 75 degrees    Diagnosis Line Sinus tachycardia  Nonspecific T wave abnormality  Abnormal ECG    Confirmed by JUDY CAMPOVERDE, [facility] ([id number]) on 12/24/2024 8:49:23 AM    < end of copied text >

## 2024-12-24 NOTE — BH CONSULTATION LIAISON ASSESSMENT NOTE - SUMMARY
68 y/o F with a hx of Schizoaffective disorder, bipolar type, HTN, hypothyroidism, neurogenic bladder, GERD, cervical myelopathy, and vitiligo, who is admitted due to suspected sepsis and a rash. She is consulted for medication management and r/o catatonia. Patient with stable mood, anxiety and psychotic symptoms under current pharmacotherapy. She is not catatonic. She is not delirious. Is probably close to her baseline and is not suicidal, homicidal or psychotic.    -Cont Zyprexa 20 mg PO @ 9AM and 8PM  -Cont Zoloft 200 mg PO daily  -Cont Divalproex 750 mg PO HS  -Cont Ativan 0.5 mg PO BID  -Check Depakote level  -PRN: Zyprexa 5 mg IM q 8 hrs PRN for severe agitation  -No need for an inpatient psychiatric admission or 1:1  -Case discussed with the primary team  -Please reconsult as needed

## 2024-12-24 NOTE — PROGRESS NOTE ADULT - PROBLEM SELECTOR PLAN 6
Hx of Bipolar disorder, on divalproex sodium 250 mg 3 tabs QHS, divalproex sodium 500 mg QD  - c/w home med  - f/u valproic acid level in am

## 2024-12-24 NOTE — PROGRESS NOTE ADULT - PROBLEM SELECTOR PLAN 4
The patient has been re-examined and I agree with the above assessment or I updated with my findings.
Lactate 3 on admission  s/p 2 L NS bolus   Repeat Lactate 1.7   RESOLVED

## 2024-12-24 NOTE — BH CONSULTATION LIAISON ASSESSMENT NOTE - OTHER PAST PSYCHIATRIC HISTORY (INCLUDE DETAILS REGARDING ONSET, COURSE OF ILLNESS, INPATIENT/OUTPATIENT TREATMENT)
Hx of Schizoaffective disorder. More than 16 inpatient hospitalizations as per chart. Last one 2-3 years ago as per patient for SI. Followed by Dr. Victor. On Zyprexa 20 mg PO BID, Zoloft 200 mg PO daily, Divalproex 750 mg PO HS and Ativan 0.5 mg PO BID. Was on Clozapine, Klonopin and Aristada in the past.

## 2024-12-25 LAB
ANION GAP SERPL CALC-SCNC: 3 MMOL/L — LOW (ref 5–17)
BUN SERPL-MCNC: 20 MG/DL — HIGH (ref 7–18)
CALCIUM SERPL-MCNC: 8.8 MG/DL — SIGNIFICANT CHANGE UP (ref 8.4–10.5)
CHLORIDE SERPL-SCNC: 109 MMOL/L — HIGH (ref 96–108)
CO2 SERPL-SCNC: 29 MMOL/L — SIGNIFICANT CHANGE UP (ref 22–31)
CREAT SERPL-MCNC: 0.66 MG/DL — SIGNIFICANT CHANGE UP (ref 0.5–1.3)
EGFR: 96 ML/MIN/1.73M2 — SIGNIFICANT CHANGE UP
GLUCOSE SERPL-MCNC: 96 MG/DL — SIGNIFICANT CHANGE UP (ref 70–99)
POTASSIUM SERPL-MCNC: 3.7 MMOL/L — SIGNIFICANT CHANGE UP (ref 3.5–5.3)
POTASSIUM SERPL-SCNC: 3.7 MMOL/L — SIGNIFICANT CHANGE UP (ref 3.5–5.3)
SODIUM SERPL-SCNC: 141 MMOL/L — SIGNIFICANT CHANGE UP (ref 135–145)
VALPROATE SERPL-MCNC: 87 UG/ML — SIGNIFICANT CHANGE UP (ref 50–100)

## 2024-12-25 RX ORDER — CHLORHEXIDINE GLUCONATE 1.2 MG/ML
1 RINSE ORAL DAILY
Refills: 0 | Status: DISCONTINUED | OUTPATIENT
Start: 2024-12-25 | End: 2024-12-26

## 2024-12-25 RX ORDER — MUPIROCIN 2 %
1 OINTMENT (GRAM) TOPICAL
Refills: 0 | Status: DISCONTINUED | OUTPATIENT
Start: 2024-12-25 | End: 2024-12-26

## 2024-12-25 RX ADMIN — Medication 1 APPLICATION(S): at 17:53

## 2024-12-25 RX ADMIN — OLANZAPINE 20 MILLIGRAM(S): 15 TABLET ORAL at 21:39

## 2024-12-25 RX ADMIN — CIPROFLOXACIN 1 DROP(S): 3 SOLUTION OPHTHALMIC at 05:39

## 2024-12-25 RX ADMIN — Medication 1 TABLET(S): at 12:14

## 2024-12-25 RX ADMIN — LEVOTHYROXINE SODIUM 125 MICROGRAM(S): 175 TABLET ORAL at 05:40

## 2024-12-25 RX ADMIN — DIVALPROEX SODIUM 500 MILLIGRAM(S): 500 TABLET, DELAYED RELEASE ORAL at 05:40

## 2024-12-25 RX ADMIN — LORAZEPAM 0.5 MILLIGRAM(S): 1 TABLET ORAL at 05:42

## 2024-12-25 RX ADMIN — CIPROFLOXACIN 1 DROP(S): 3 SOLUTION OPHTHALMIC at 17:52

## 2024-12-25 RX ADMIN — Medication 1 APPLICATION(S): at 05:40

## 2024-12-25 RX ADMIN — OLANZAPINE 20 MILLIGRAM(S): 15 TABLET ORAL at 09:33

## 2024-12-25 RX ADMIN — CIPROFLOXACIN 1 DROP(S): 3 SOLUTION OPHTHALMIC at 12:13

## 2024-12-25 RX ADMIN — SERTRALINE HYDROCHLORIDE 200 MILLIGRAM(S): 25 TABLET ORAL at 12:15

## 2024-12-25 RX ADMIN — ENOXAPARIN SODIUM 40 MILLIGRAM(S): 60 INJECTION INTRAVENOUS; SUBCUTANEOUS at 21:39

## 2024-12-25 RX ADMIN — LORAZEPAM 0.5 MILLIGRAM(S): 1 TABLET ORAL at 17:52

## 2024-12-25 RX ADMIN — CIPROFLOXACIN 1 DROP(S): 3 SOLUTION OPHTHALMIC at 00:38

## 2024-12-25 RX ADMIN — Medication 81 MILLIGRAM(S): at 12:13

## 2024-12-25 RX ADMIN — ATORVASTATIN CALCIUM 10 MILLIGRAM(S): 40 TABLET, FILM COATED ORAL at 21:39

## 2024-12-25 RX ADMIN — Medication 325 MILLIGRAM(S): at 12:16

## 2024-12-25 RX ADMIN — CHLORHEXIDINE GLUCONATE 1 APPLICATION(S): 1.2 RINSE ORAL at 12:14

## 2024-12-25 RX ADMIN — AMANTADINE HYDROCHLORIDE 100 MILLIGRAM(S): 100 CAPSULE ORAL at 12:13

## 2024-12-25 RX ADMIN — Medication 3 MILLIGRAM(S): at 21:39

## 2024-12-25 RX ADMIN — DIVALPROEX SODIUM 750 MILLIGRAM(S): 500 TABLET, DELAYED RELEASE ORAL at 21:39

## 2024-12-25 RX ADMIN — Medication 1 APPLICATION(S): at 12:15

## 2024-12-25 NOTE — PHYSICAL THERAPY INITIAL EVALUATION ADULT - PERTINENT HX OF CURRENT PROBLEM, REHAB EVAL
69 y.o w/ fever, chills,  body aches, nausea, vomiting. Gross weakness and impaired mobility. Additional past medical history as detailed below by medical team.

## 2024-12-25 NOTE — PHYSICAL THERAPY INITIAL EVALUATION ADULT - PASSIVE RANGE OF MOTION EXAMINATION, REHAB EVAL
except Rt shoulder ~3/4 range. Lt shoulder to 90 deg. Lt hand mtp contacture in fllx to ~ 90 deg flx at mtp (chronic)/bilateral upper extremity Passive ROM was WFL (within functional limits)/bilateral lower extremity Passive ROM was WFL (within functional limits)

## 2024-12-25 NOTE — PHYSICAL THERAPY INITIAL EVALUATION ADULT - ACTIVE RANGE OF MOTION EXAMINATION, REHAB EVAL
except Rt shoulder ~1/2 range. Lt shoulder to 30 deg. Lt hand mtp contacture in fllx can move a few deg (chronic). hips ~1/2 range

## 2024-12-25 NOTE — PHYSICAL THERAPY INITIAL EVALUATION ADULT - GAIT DEVIATIONS NOTED, PT EVAL
decreased bennett/increased time in double stance/decreased velocity of limb motion/decreased step length/decreased stride length/decreased weight-shifting ability

## 2024-12-25 NOTE — PHYSICAL THERAPY INITIAL EVALUATION ADULT - MANUAL MUSCLE TESTING RESULTS, REHAB EVAL
RUE 4/5 except shoulder 3-/5. Lt shoulder 2+/5 within available range. Lt ebow and hand (withing available range at least 3+/5 functionally. Hip 3-/5. knees 4/5. Ankles at least 3/5 functionally

## 2024-12-25 NOTE — CHART NOTE - NSCHARTNOTEFT_GEN_A_CORE
EVENT: Staph aureus PCR Result: Detected (12.24.24 @ 00:10)  MRSA PCR Result.: NotDetec: (12.24.24 @ 00:10)    BRIEF HPI: 70 yo F, from [facility], with PMH of Bipolar disorder, Schizoaffective disorder, Anxiety, Depression,  HTN, Hypothyroidism, Neurologic bladder, Anemia, GERD, Cervical myelopathy, Vitiligo present with subjective fever, chills,  body aches, nausea, vomiting ( 1 episode, NBNB)  x1 day. Accompanied with 2 days history of itchy scalp and redness after using hair dye. Admitted to medicine for sepsis, lactic acidosis.   Blood cultures in progress from 12/23. UA and CXR negative for infection so far. Psych Dr. Taveras  consulted for assistance with medication management. ID Dr. Briscoe.  Patient will need PT eval for dispo planning.     Vital Signs Last 24 Hrs  T(C): 36.6 (24 Dec 2024 21:12), Max: 36.8 (24 Dec 2024 05:17)  T(F): 97.8 (24 Dec 2024 21:12), Max: 98.2 (24 Dec 2024 05:17)  HR: 72 (24 Dec 2024 21:12) (65 - 78)  BP: 100/63 (24 Dec 2024 21:12) (100/63 - 132/76)  BP(mean): --  RR: 18 (24 Dec 2024 21:12) (18 - 20)  SpO2: 95% (24 Dec 2024 21:12) (95% - 97%)    Parameters below as of 24 Dec 2024 21:12  Patient On (Oxygen Delivery Method): room air    PLAN  Mupirocin 2% Ointment 1 Application(s) Topical two times a day X 5 dys  Chlorhexidine 2% Cloths 1 Application(s) Topical daily X 5 dys

## 2024-12-25 NOTE — CONSULT NOTE ADULT - SKIN COMMENTS
maculopapular rash over the forehead which has mostly resolved and over the neck and upper back region which is improving.

## 2024-12-25 NOTE — PHYSICAL THERAPY INITIAL EVALUATION ADULT - DIAGNOSIS, PT EVAL
(ICF Model) Pt. present w/deficits in Body Structures/Function (Impairments), incl: Strength, Balance, tone and ROM leading to deficits in performing the below noted Activities (Limitations).

## 2024-12-25 NOTE — CONSULT NOTE ADULT - SUBJECTIVE AND OBJECTIVE BOX
HPI:  70 yo F, from [facility], with PMH of Bipolar disorder, Schizoaffective disorder, Anxiety, Depression HTN, Hypothyroidism, Neurologic bladder, Anemia, GERD, Cervical myelopathy, Vitiligo present with subjective fever, chills,  body aches, nausea, vomiting ( 1 episode, NBNB) that started today. Accompanied with 2 days history of itchy scalp and redness after using hair dye.  Patient  reports she felt very hot and cold today when she woke up this morning . Patient denies any cough, nasal congestion, chest pain, SOB, abdominal pain, constipation, diarrhea, urinary symptoms.    In the ED,   v/s: 128/74, 108, 101.5, 95%  RA  Labs: WBC 14k, Lactate 3 > 1.7   RSV/FLU/COVID; negative   EK BPM sinus tachycardia   CXR:  negative   s/p Tylenol, Decadron 10mg, Benadryl, Famotidine, Levaquin 750  s/p 2 L NS bolus   (23 Dec 2024 17:34)            PAST MEDICAL & SURGICAL HISTORY:  Schizoaffective disorder      Hypertension      Hypothyroidism      No significant past surgical history          No Known Drug Allergies  Hair dye (Unknown)      Meds:  acetaminophen     Tablet .. 650 milliGRAM(s) Oral every 6 hours PRN  amantadine 100 milliGRAM(s) Oral daily  aspirin enteric coated 81 milliGRAM(s) Oral daily  atorvastatin 10 milliGRAM(s) Oral at bedtime  chlorhexidine 2% Cloths 1 Application(s) Topical daily  ciprofloxacin  0.3% Ophthalmic Solution 1 Drop(s) Both EYES four times a day  divalproex  milliGRAM(s) Oral at bedtime  divalproex  milliGRAM(s) Oral <User Schedule>  enoxaparin Injectable 40 milliGRAM(s) SubCutaneous every 24 hours  ferrous    sulfate 325 milliGRAM(s) Oral daily  hydrocortisone 2.5% Lotion 1 Application(s) Topical daily  levothyroxine 125 MICROGram(s) Oral daily  LORazepam     Tablet 0.5 milliGRAM(s) Oral every 12 hours  melatonin 3 milliGRAM(s) Oral at bedtime  multivitamin 1 Tablet(s) Oral daily  mupirocin 2% Ointment 1 Application(s) Topical two times a day  OLANZapine 20 milliGRAM(s) Oral <User Schedule>  ondansetron Injectable 4 milliGRAM(s) IV Push every 8 hours PRN  sertraline 200 milliGRAM(s) Oral daily      SOCIAL HISTORY:  Smoker:  YES / NO        PACK YEARS:                         WHEN QUIT?  ETOH use:  YES / NO               FREQUENCY / QUANTITY:  Ilicit Drug use:  YES / NO  Occupation:  Assisted device use (Cane / Walker):  Live with:    FAMILY HISTORY:  No pertinent family history in first degree relatives        VITALS:  Vital Signs Last 24 Hrs  T(C): 36.8 (25 Dec 2024 12:51), Max: 36.8 (25 Dec 2024 12:51)  T(F): 98.2 (25 Dec 2024 12:51), Max: 98.2 (25 Dec 2024 12:51)  HR: 88 (25 Dec 2024 13:05) (72 - 88)  BP: 123/78 (25 Dec 2024 13:05) (99/65 - 123/78)  BP(mean): --  RR: 18 (25 Dec 2024 12:51) (18 - 20)  SpO2: 96% (25 Dec 2024 13:05) (94% - 96%)    Parameters below as of 25 Dec 2024 13:05  Patient On (Oxygen Delivery Method): room air        LABS/DIAGNOSTIC TESTS:                          11.4   13.79 )-----------( 231      ( 24 Dec 2024 06:24 )             35.2     WBC Count: 13.79 K/uL ( @ 06:24)  WBC Count: 14.79 K/uL ( @ 11:00)          141  |  109[H]  |  20[H]  ----------------------------<  96  3.7   |  29  |  0.66    Ca    8.8      25 Dec 2024 08:18  Phos  2.7     12-24  Mg     2.5     24    TPro  6.9  /  Alb  2.8[L]  /  TBili  0.2  /  DBili  x   /  AST  12  /  ALT  19  /  AlkPhos  78  -      Urinalysis with Rflx Culture (24 @ 00:05)   Urine Appearance: Clear  Color: Yellow  Specific Gravity: 1.028  pH Urine: 5.5  Protein, Urine: Negative mg/dL  Glucose Qualitative, Urine: Negative mg/dL  Ketone - Urine: Trace mg/dL  Blood, Urine: Negative  Bilirubin: Negative  Urobilinogen: 1.0 mg/dL  Leukocyte Esterase Concentration: Negative  Nitrite: Negative      LIVER FUNCTIONS - ( 24 Dec 2024 06:24 )  Alb: 2.8 g/dL / Pro: 6.9 g/dL / ALK PHOS: 78 U/L / ALT: 19 U/L DA / AST: 12 U/L / GGT: x                 LACTATE:    ABG -     CULTURES:   .Blood BLOOD   @ 11:00   No growth at 24 hours  --  --      .Blood BLOOD   @ 10:50   No growth at 24 hours  --  --          RADIOLOGY:< from: Xray Chest 1 View- PORTABLE-Urgent (24 @ 12:27) >  ACC: [id number] EXAM:  XR CHEST PORTABLE URGENT 1V   ORDERED BY: NACHO PADILLA     PROCEDURE DATE:  2024          INTERPRETATION:  Sepsis.    AP chest. Prior 2020.    Cardiac silhouette and pulmonary vasculature exaggerated by AP film   shallow inspiration.  mild atelectasis lateral left base. No gross   consolidation. No pleural effusion.    IMPRESSION: Low lung volumes. No gross consolidation    --- End of Report ---            HATTIE GILMORE MD; Attending Radiologist  This document has been electronically signed. Dec 23 2024  2:07PM    < end of copied text >        ROS  [  ] UNABLE TO ELICIT               HPI:  70 yo F, from [facility], with PMH of Bipolar disorder, Schizoaffective disorder, Anxiety, Depression HTN, Hypothyroidism, Neurologic bladder, Anemia, GERD, Cervical myelopathy, Vitiligo present with subjective fever, chills,  body aches, nausea, vomiting ( 1 episode, NBNB) that started today. Accompanied with 2 days history of itchy scalp and redness after using hair dye.  Patient  reports she felt very hot and cold today when she woke up this morning . Patient denies any cough, nasal congestion, chest pain, SOB, abdominal pain, constipation, diarrhea, urinary symptoms.    In the ED,   v/s: 128/74, 108, 101.5, 95%  RA  Labs: WBC 14k, Lactate 3 > 1.7   RSV/FLU/COVID; negative   EK BPM sinus tachycardia   CXR:  negative   s/p Tylenol, Decadron 10mg, Benadryl, Famotidine, Levaquin 750  s/p 2 L NS bolus   (23 Dec 2024 17:34)        History as above, asked to see this patient who presented for a rash over her forehead , neck and upper back x 2 days along with feeling chills and hot and vomited x 1, she was found to have a temp of 101.5 in the ER but no other fevers since then and has not been on any antibiotics. She states she dyed her hair and her rash occurred after that. She has been on steroid cr and her rash is improving already especially over her forehead. She has no more fevers or chills, no more nausea, vomiting , no diarrhea , no urinary symptoms, no coughing or SOB, she had some eye disc          PAST MEDICAL & SURGICAL HISTORY:  Schizoaffective disorder      Hypertension      Hypothyroidism      No significant past surgical history          No Known Drug Allergies  Hair dye (Unknown)      Meds:  acetaminophen     Tablet .. 650 milliGRAM(s) Oral every 6 hours PRN  amantadine 100 milliGRAM(s) Oral daily  aspirin enteric coated 81 milliGRAM(s) Oral daily  atorvastatin 10 milliGRAM(s) Oral at bedtime  chlorhexidine 2% Cloths 1 Application(s) Topical daily  ciprofloxacin  0.3% Ophthalmic Solution 1 Drop(s) Both EYES four times a day  divalproex  milliGRAM(s) Oral at bedtime  divalproex  milliGRAM(s) Oral <User Schedule>  enoxaparin Injectable 40 milliGRAM(s) SubCutaneous every 24 hours  ferrous    sulfate 325 milliGRAM(s) Oral daily  hydrocortisone 2.5% Lotion 1 Application(s) Topical daily  levothyroxine 125 MICROGram(s) Oral daily  LORazepam     Tablet 0.5 milliGRAM(s) Oral every 12 hours  melatonin 3 milliGRAM(s) Oral at bedtime  multivitamin 1 Tablet(s) Oral daily  mupirocin 2% Ointment 1 Application(s) Topical two times a day  OLANZapine 20 milliGRAM(s) Oral <User Schedule>  ondansetron Injectable 4 milliGRAM(s) IV Push every 8 hours PRN  sertraline 200 milliGRAM(s) Oral daily      SOCIAL HISTORY:  Smoker:  YES / NO        PACK YEARS:                         WHEN QUIT?  ETOH use:  YES / NO               FREQUENCY / QUANTITY:  Ilicit Drug use:  YES / NO  Occupation:  Assisted device use (Cane / Walker):  Live with:    FAMILY HISTORY:  No pertinent family history in first degree relatives        VITALS:  Vital Signs Last 24 Hrs  T(C): 36.8 (25 Dec 2024 12:51), Max: 36.8 (25 Dec 2024 12:51)  T(F): 98.2 (25 Dec 2024 12:51), Max: 98.2 (25 Dec 2024 12:51)  HR: 88 (25 Dec 2024 13:05) (72 - 88)  BP: 123/78 (25 Dec 2024 13:05) (99/65 - 123/78)  BP(mean): --  RR: 18 (25 Dec 2024 12:51) (18 - 20)  SpO2: 96% (25 Dec 2024 13:05) (94% - 96%)    Parameters below as of 25 Dec 2024 13:05  Patient On (Oxygen Delivery Method): room air        LABS/DIAGNOSTIC TESTS:                          11.4   13.79 )-----------( 231      ( 24 Dec 2024 06:24 )             35.2     WBC Count: 13.79 K/uL ( @ 06:24)  WBC Count: 14.79 K/uL ( @ 11:00)          141  |  109[H]  |  20[H]  ----------------------------<  96  3.7   |  29  |  0.66    Ca    8.8      25 Dec 2024 08:18  Phos  2.7     -  Mg     2.5     -24    TPro  6.9  /  Alb  2.8[L]  /  TBili  0.2  /  DBili  x   /  AST  12  /  ALT  19  /  AlkPhos  78        Urinalysis with Rflx Culture (24 @ 00:05)   Urine Appearance: Clear  Color: Yellow  Specific Gravity: 1.028  pH Urine: 5.5  Protein, Urine: Negative mg/dL  Glucose Qualitative, Urine: Negative mg/dL  Ketone - Urine: Trace mg/dL  Blood, Urine: Negative  Bilirubin: Negative  Urobilinogen: 1.0 mg/dL  Leukocyte Esterase Concentration: Negative  Nitrite: Negative      LIVER FUNCTIONS - ( 24 Dec 2024 06:24 )  Alb: 2.8 g/dL / Pro: 6.9 g/dL / ALK PHOS: 78 U/L / ALT: 19 U/L DA / AST: 12 U/L / GGT: x                 LACTATE:    ABG -     CULTURES:   .Blood BLOOD  - @ 11:00   No growth at 24 hours  --  --      .Blood BLOOD  - @ 10:50   No growth at 24 hours  --  --          RADIOLOGY:< from: Xray Chest 1 View- PORTABLE-Urgent (24 @ 12:27) >  ACC: [id number] EXAM:  XR CHEST PORTABLE URGENT 1V   ORDERED BY: NACHO PADILLA     PROCEDURE DATE:  2024          INTERPRETATION:  Sepsis.    AP chest. Prior 2020.    Cardiac silhouette and pulmonary vasculature exaggerated by AP film   shallow inspiration.  mild atelectasis lateral left base. No gross   consolidation. No pleural effusion.    IMPRESSION: Low lung volumes. No gross consolidation    --- End of Report ---            HATTIE GILMORE MD; Attending Radiologist  This document has been electronically signed. Dec 23 2024  2:07PM    < end of copied text >        ROS  [  ] UNABLE TO ELICIT               HPI:  68 yo F, from [facility], with PMH of Bipolar disorder, Schizoaffective disorder, Anxiety, Depression HTN, Hypothyroidism, Neurologic bladder, Anemia, GERD, Cervical myelopathy, Vitiligo present with subjective fever, chills,  body aches, nausea, vomiting ( 1 episode, NBNB) that started today. Accompanied with 2 days history of itchy scalp and redness after using hair dye.  Patient  reports she felt very hot and cold today when she woke up this morning . Patient denies any cough, nasal congestion, chest pain, SOB, abdominal pain, constipation, diarrhea, urinary symptoms.    In the ED,   v/s: 128/74, 108, 101.5, 95%  RA  Labs: WBC 14k, Lactate 3 > 1.7   RSV/FLU/COVID; negative   EK BPM sinus tachycardia   CXR:  negative   s/p Tylenol, Decadron 10mg, Benadryl, Famotidine, Levaquin 750  s/p 2 L NS bolus   (23 Dec 2024 17:34)        History as above, asked to see this patient who presented for a rash over her forehead , neck and upper back x 2 days along with feeling chills and hot and vomited x 1, she was found to have a temp of 101.5 in the ER but no other fevers since then and has not been on any antibiotics. She states she dyed her hair and her rash occurred after that. She has been on steroid cr and her rash is improving already especially over her forehead. She has no more fevers or chills, no more nausea, vomiting , no diarrhea , no urinary symptoms, no coughing or SOB, she had some eye discharge and was placed on cipro eyedrops for it. She was also found to have an elevated WBC count on admission also.          PAST MEDICAL & SURGICAL HISTORY:  Schizoaffective disorder      Hypertension      Hypothyroidism      No significant past surgical history          No Known Drug Allergies  Hair dye (Unknown)      Meds:  acetaminophen     Tablet .. 650 milliGRAM(s) Oral every 6 hours PRN  amantadine 100 milliGRAM(s) Oral daily  aspirin enteric coated 81 milliGRAM(s) Oral daily  atorvastatin 10 milliGRAM(s) Oral at bedtime  chlorhexidine 2% Cloths 1 Application(s) Topical daily  ciprofloxacin  0.3% Ophthalmic Solution 1 Drop(s) Both EYES four times a day  divalproex  milliGRAM(s) Oral at bedtime  divalproex  milliGRAM(s) Oral <User Schedule>  enoxaparin Injectable 40 milliGRAM(s) SubCutaneous every 24 hours  ferrous    sulfate 325 milliGRAM(s) Oral daily  hydrocortisone 2.5% Lotion 1 Application(s) Topical daily  levothyroxine 125 MICROGram(s) Oral daily  LORazepam     Tablet 0.5 milliGRAM(s) Oral every 12 hours  melatonin 3 milliGRAM(s) Oral at bedtime  multivitamin 1 Tablet(s) Oral daily  mupirocin 2% Ointment 1 Application(s) Topical two times a day  OLANZapine 20 milliGRAM(s) Oral <User Schedule>  ondansetron Injectable 4 milliGRAM(s) IV Push every 8 hours PRN  sertraline 200 milliGRAM(s) Oral daily      SOCIAL HISTORY:  Smoker:  no  ETOH use:  no          FAMILY HISTORY:  No pertinent family history in first degree relatives        VITALS:  Vital Signs Last 24 Hrs  T(C): 36.8 (25 Dec 2024 12:51), Max: 36.8 (25 Dec 2024 12:51)  T(F): 98.2 (25 Dec 2024 12:51), Max: 98.2 (25 Dec 2024 12:51)  HR: 88 (25 Dec 2024 13:05) (72 - 88)  BP: 123/78 (25 Dec 2024 13:05) (99/65 - 123/78)  BP(mean): --  RR: 18 (25 Dec 2024 12:51) (18 - 20)  SpO2: 96% (25 Dec 2024 13:05) (94% - 96%)    Parameters below as of 25 Dec 2024 13:05  Patient On (Oxygen Delivery Method): room air        LABS/DIAGNOSTIC TESTS:                          11.4   13.79 )-----------( 231      ( 24 Dec 2024 06:24 )             35.2     WBC Count: 13.79 K/uL ( @ 06:24)  WBC Count: 14.79 K/uL ( @ 11:00)          141  |  109[H]  |  20[H]  ----------------------------<  96  3.7   |  29  |  0.66    Ca    8.8      25 Dec 2024 08:18  Phos  2.7     -24  Mg     2.5     -24    TPro  6.9  /  Alb  2.8[L]  /  TBili  0.2  /  DBili  x   /  AST  12  /  ALT  19  /  AlkPhos  78  -      Urinalysis with Rflx Culture (24 @ 00:05)   Urine Appearance: Clear  Color: Yellow  Specific Gravity: 1.028  pH Urine: 5.5  Protein, Urine: Negative mg/dL  Glucose Qualitative, Urine: Negative mg/dL  Ketone - Urine: Trace mg/dL  Blood, Urine: Negative  Bilirubin: Negative  Urobilinogen: 1.0 mg/dL  Leukocyte Esterase Concentration: Negative  Nitrite: Negative      LIVER FUNCTIONS - ( 24 Dec 2024 06:24 )  Alb: 2.8 g/dL / Pro: 6.9 g/dL / ALK PHOS: 78 U/L / ALT: 19 U/L DA / AST: 12 U/L / GGT: x                 LACTATE:    ABG -     CULTURES:   .Blood BLOOD   @ 11:00   No growth at 24 hours  --  --      .Blood BLOOD   @ 10:50   No growth at 24 hours  --  --          RADIOLOGY:< from: Xray Chest 1 View- PORTABLE-Urgent (24 @ 12:27) >  ACC: [id number] EXAM:  XR CHEST PORTABLE URGENT 1V   ORDERED BY: NACHO PADILLA     PROCEDURE DATE:  2024          INTERPRETATION:  Sepsis.    AP chest. Prior 2020.    Cardiac silhouette and pulmonary vasculature exaggerated by AP film   shallow inspiration.  mild atelectasis lateral left base. No gross   consolidation. No pleural effusion.    IMPRESSION: Low lung volumes. No gross consolidation    --- End of Report ---            HATTIE GILMORE MD; Attending Radiologist  This document has been electronically signed. Dec 23 2024  2:07PM    < end of copied text >        ROS  [  ] UNABLE TO ELICIT

## 2024-12-25 NOTE — CONSULT NOTE ADULT - ASSESSMENT
Fever - resolved  Leukocytosis   Rash - allergic  No signs or source of infection and has improved without any systemic antibiotics      Plan - Cont steroid lotion  no need for any systemic antibiotics at this time.  reconsult prn.

## 2024-12-25 NOTE — PHYSICAL THERAPY INITIAL EVALUATION ADULT - ADDITIONAL COMMENTS
-- owns rolling walker  -- denies any falls in past 6 months  -- reports currently receiving PT services at assisted living

## 2024-12-25 NOTE — PHYSICAL THERAPY INITIAL EVALUATION ADULT - PLANNED THERAPY INTERVENTIONS, PT EVAL
balance training/neuromuscular re-education/postural re-education/ROM/strengthening/stretching/transfer training

## 2024-12-25 NOTE — PROGRESS NOTE ADULT - SUBJECTIVE AND OBJECTIVE BOX
Patient is a 69y old  Female who presents with a chief complaint of sepsis (24 Dec 2024 17:10)    PATIENT IS SEEN AND EXAMINED IN MEDICAL FLOOR.  IDANIAT [    ]    ZACKARY [   ]      GT [   ]    ALLERGIES:  No Known Drug Allergies  Hair dye (Unknown)      Daily     Daily     VITALS:    Vital Signs Last 24 Hrs  T(C): 36.6 (24 Dec 2024 21:12), Max: 36.8 (24 Dec 2024 05:17)  T(F): 97.8 (24 Dec 2024 21:12), Max: 98.2 (24 Dec 2024 05:17)  HR: 72 (24 Dec 2024 21:12) (65 - 78)  BP: 100/63 (24 Dec 2024 21:12) (100/63 - 132/76)  BP(mean): --  RR: 18 (24 Dec 2024 21:12) (18 - 20)  SpO2: 95% (24 Dec 2024 21:12) (95% - 97%)    Parameters below as of 24 Dec 2024 21:12  Patient On (Oxygen Delivery Method): room air        LABS:    CBC Full  -  ( 24 Dec 2024 06:24 )  WBC Count : 13.79 K/uL  RBC Count : 3.51 M/uL  Hemoglobin : 11.4 g/dL  Hematocrit : 35.2 %  Platelet Count - Automated : 231 K/uL  Mean Cell Volume : 100.3 fl  Mean Cell Hemoglobin : 32.5 pg  Mean Cell Hemoglobin Concentration : 32.4 g/dL  Auto Neutrophil # : x  Auto Lymphocyte # : x  Auto Monocyte # : x  Auto Eosinophil # : x  Auto Basophil # : x  Auto Neutrophil % : x  Auto Lymphocyte % : x  Auto Monocyte % : x  Auto Eosinophil % : x  Auto Basophil % : x    PT/INR - ( 23 Dec 2024 11:00 )   PT: 11.9 sec;   INR: 1.03 ratio         PTT - ( 23 Dec 2024 11:00 )  PTT:35.7 sec  12-24    142  |  110[H]  |  19[H]  ----------------------------<  105[H]  3.3[L]   |  26  |  0.69    Ca    8.6      24 Dec 2024 06:24  Phos  2.7     12-24  Mg     2.5     12-24    TPro  6.9  /  Alb  2.8[L]  /  TBili  0.2  /  DBili  x   /  AST  12  /  ALT  19  /  AlkPhos  78  12-24    CAPILLARY BLOOD GLUCOSE      POCT Blood Glucose.: 122 mg/dL (24 Dec 2024 16:36)        LIVER FUNCTIONS - ( 24 Dec 2024 06:24 )  Alb: 2.8 g/dL / Pro: 6.9 g/dL / ALK PHOS: 78 U/L / ALT: 19 U/L DA / AST: 12 U/L / GGT: x           Creatinine Trend: 0.69<--, 1.05<--  I&O's Summary          .Blood BLOOD  12-23 @ 11:00   No growth at 24 hours  --  --      .Blood BLOOD  12-23 @ 10:50   No growth at 24 hours  --  --          MEDICATIONS:    MEDICATIONS  (STANDING):  amantadine 100 milliGRAM(s) Oral daily  aspirin enteric coated 81 milliGRAM(s) Oral daily  atorvastatin 10 milliGRAM(s) Oral at bedtime  divalproex  milliGRAM(s) Oral at bedtime  divalproex  milliGRAM(s) Oral <User Schedule>  enoxaparin Injectable 40 milliGRAM(s) SubCutaneous every 24 hours  ferrous    sulfate 325 milliGRAM(s) Oral daily  hydrocortisone 2.5% Lotion 1 Application(s) Topical daily  levothyroxine 125 MICROGram(s) Oral daily  LORazepam     Tablet 0.5 milliGRAM(s) Oral every 12 hours  melatonin 3 milliGRAM(s) Oral at bedtime  multivitamin 1 Tablet(s) Oral daily  OLANZapine 20 milliGRAM(s) Oral <User Schedule>  sertraline 200 milliGRAM(s) Oral daily      MEDICATIONS  (PRN):  acetaminophen     Tablet .. 650 milliGRAM(s) Oral every 6 hours PRN Temp greater or equal to 38C (100.4F), Mild Pain (1 - 3)  ondansetron Injectable 4 milliGRAM(s) IV Push every 8 hours PRN Nausea and/or Vomiting      REVIEW OF SYSTEMS:                           ALL ROS DONE [ X   ]    CONSTITUTIONAL:  LETHARGIC [   ], FEVER [   ], UNRESPONSIVE [   ]  CVS:  CP  [   ], SOB, [   ], PALPITATIONS [   ], DIZZYNESS [   ]  RS: COUGH [   ], SPUTUM [   ]  GI: ABDOMINAL PAIN [   ], NAUSEA [   ], VOMITINGS [   ], DIARRHEA [   ], CONSTIPATION [   ]  :  DYSURIA [   ], NOCTURIA [   ], INCREASED FREQUENCY [   ], DRIBLING [   ],  SKELETAL: PAINFUL JOINTS [   ], SWOLLEN JOINTS [   ], NECK ACHE [   ], LOW BACK ACHE [   ],  SKIN : ULCERS [   ], RASH [   ], ITCHING [   ]  CNS: HEAD ACHE [   ], DOUBLE VISION [   ], BLURRED VISION [   ], AMS / CONFUSION [   ], SEIZURES [   ], WEAKNESS [   ],TINGLING / NUMBNESS [   ]    PHYSICAL EXAMINATION:  GENERAL APPEARANCE: NO DISTRESS  HEENT:  NO PALLOR, NO  JVD,  NO   NODES, NECK SUPPLE  CVS: S1 +, S2 +,   RS: AEEB,  OCCASIONAL  RALES +,   NO RONCHI  ABD: SOFT, NT, NO, BS +  EXT: PE +   SKIN: WARM,    HEALING SMALL SCABS/ERYTHEMATOUS RASH ALONG HAIRLINE, UPPER BACK AND NOTED ON SUPRCLAVICULAR AREA [NOT DRAINING], EXCORIATIONS +  SKELETAL:  ROM ACCEPTABLE  CNS:  AAO X 2-3    RADIOLOGY :  RADIOLOGY AND READINGS REVIEWED    ASSESSMENT :     Sepsis  Schizoaffective disorder  Hypertension  Hypothyroidism        PLAN:  HPI:  68 yo F, from [facility], with PMH of Bipolar disorder, Schizoaffective disorder, Anxiety, Depression HTN, Hypothyroidism, Neurologic bladder, Anemia, GERD, Cervical myelopathy, Vitiligo present with subjective fever, chills,  body aches, nausea, vomiting ( 1 episode, NBNB) that started today. Accompanied with 2 days history of itchy scalp and redness after using hair dye.  Patient  reports she felt very hot and cold today when she woke up this morning . Patient denies any cough, nasal congestion, chest pain, SOB, abdominal pain, constipation, diarrhea, urinary symptoms.    In the ED,   v/s: 128/74, 108, 101.5, 95%  RA  Labs: WBC 14k, Lactate 3 > 1.7   RSV/FLU/COVID; negative   EK BPM sinus tachycardia   CXR:  negative   s/p Tylenol, Decadron 10mg, Benadryl, Famotidine, Levaquin 750  s/p 2 L NS bolus   (23 Dec 2024 17:34)        # HYPOTENSION S/T IVVD [FROM VOMITING] VS. SEPSIS  - ETIOLOGY UNCLEAR   # ALLERGIC REACTION TO ? HAIRDYE - IMPROVING  - IV FLUIDS  - NOTED CXR, NOTED RVP  - F/U UA  - S/P DECADRON, BENADRYL, FAMOTIDINE  - NO ORAL INVOLVEMENT, NO SWELLING - WILL MONITOR  - HOLD ABX FOR NOW  - MONITOR BLOOD CULTURE  - WILL GIVE TOPICAL TRX FOR RASH  - ID CONSULT    # CONJUNCTIVITIS  - CIPROFLOXACIN EYE DROPS  - EYE HYGIENE PER PROTOCOL    # IMPAIRED GAIT S/T OA, CERVICAL AND LS RADICULOPATHY  - F/U PT EVAL    # BIPOLAR DISORDER, SCHIZOAFFECTIVE DISORDER, ANXIETY, DEPRESSION    # HYPOTHYROIDISM  # HTN  # NEUROLOGIC BLADDER.  
Patient is a 69y old  Female who presents with a chief complaint of Dr. Elisa Granado (25 Dec 2024 19:28)    PATIENT IS SEEN AND EXAMINED IN MEDICAL FLOOR.  IDANIAT [    ]    ZACKARY [   ]      GT [   ]    ALLERGIES:  No Known Drug Allergies  Hair dye (Unknown)      Daily     Daily     VITALS:    Vital Signs Last 24 Hrs  T(C): 36.5 (25 Dec 2024 21:14), Max: 36.8 (25 Dec 2024 12:51)  T(F): 97.7 (25 Dec 2024 21:14), Max: 98.2 (25 Dec 2024 12:51)  HR: 65 (25 Dec 2024 21:14) (65 - 88)  BP: 105/64 (25 Dec 2024 21:14) (99/65 - 123/78)  BP(mean): --  RR: 18 (25 Dec 2024 21:14) (18 - 20)  SpO2: 95% (25 Dec 2024 21:14) (94% - 96%)    Parameters below as of 25 Dec 2024 21:14  Patient On (Oxygen Delivery Method): room air        LABS:    CBC Full  -  ( 24 Dec 2024 06:24 )  WBC Count : 13.79 K/uL  RBC Count : 3.51 M/uL  Hemoglobin : 11.4 g/dL  Hematocrit : 35.2 %  Platelet Count - Automated : 231 K/uL  Mean Cell Volume : 100.3 fl  Mean Cell Hemoglobin : 32.5 pg  Mean Cell Hemoglobin Concentration : 32.4 g/dL  Auto Neutrophil # : x  Auto Lymphocyte # : x  Auto Monocyte # : x  Auto Eosinophil # : x  Auto Basophil # : x  Auto Neutrophil % : x  Auto Lymphocyte % : x  Auto Monocyte % : x  Auto Eosinophil % : x  Auto Basophil % : x      12-25    141  |  109[H]  |  20[H]  ----------------------------<  96  3.7   |  29  |  0.66    Ca    8.8      25 Dec 2024 08:18  Phos  2.7     12-24  Mg     2.5     12-24    TPro  6.9  /  Alb  2.8[L]  /  TBili  0.2  /  DBili  x   /  AST  12  /  ALT  19  /  AlkPhos  78  12-24    CAPILLARY BLOOD GLUCOSE            LIVER FUNCTIONS - ( 24 Dec 2024 06:24 )  Alb: 2.8 g/dL / Pro: 6.9 g/dL / ALK PHOS: 78 U/L / ALT: 19 U/L DA / AST: 12 U/L / GGT: x           Creatinine Trend: 0.66<--, 0.69<--, 1.05<--  I&O's Summary          .Blood BLOOD  12-23 @ 11:00   No growth at 48 Hours  --  --      .Blood BLOOD  12-23 @ 10:50   No growth at 48 Hours  --  --          MEDICATIONS:    MEDICATIONS  (STANDING):  amantadine 100 milliGRAM(s) Oral daily  aspirin enteric coated 81 milliGRAM(s) Oral daily  atorvastatin 10 milliGRAM(s) Oral at bedtime  chlorhexidine 2% Cloths 1 Application(s) Topical daily  ciprofloxacin  0.3% Ophthalmic Solution 1 Drop(s) Both EYES four times a day  divalproex  milliGRAM(s) Oral at bedtime  divalproex  milliGRAM(s) Oral <User Schedule>  enoxaparin Injectable 40 milliGRAM(s) SubCutaneous every 24 hours  ferrous    sulfate 325 milliGRAM(s) Oral daily  hydrocortisone 2.5% Lotion 1 Application(s) Topical daily  levothyroxine 125 MICROGram(s) Oral daily  LORazepam     Tablet 0.5 milliGRAM(s) Oral every 12 hours  melatonin 3 milliGRAM(s) Oral at bedtime  multivitamin 1 Tablet(s) Oral daily  mupirocin 2% Ointment 1 Application(s) Topical two times a day  OLANZapine 20 milliGRAM(s) Oral <User Schedule>  sertraline 200 milliGRAM(s) Oral daily      MEDICATIONS  (PRN):  acetaminophen     Tablet .. 650 milliGRAM(s) Oral every 6 hours PRN Temp greater or equal to 38C (100.4F), Mild Pain (1 - 3)  ondansetron Injectable 4 milliGRAM(s) IV Push every 8 hours PRN Nausea and/or Vomiting      REVIEW OF SYSTEMS:                           ALL ROS DONE [ X   ]    CONSTITUTIONAL:  LETHARGIC [   ], FEVER [   ], UNRESPONSIVE [   ]  CVS:  CP  [   ], SOB, [   ], PALPITATIONS [   ], DIZZYNESS [   ]  RS: COUGH [   ], SPUTUM [   ]  GI: ABDOMINAL PAIN [   ], NAUSEA [   ], VOMITINGS [   ], DIARRHEA [   ], CONSTIPATION [   ]  :  DYSURIA [   ], NOCTURIA [   ], INCREASED FREQUENCY [   ], DRIBLING [   ],  SKELETAL: PAINFUL JOINTS [   ], SWOLLEN JOINTS [   ], NECK ACHE [   ], LOW BACK ACHE [   ],  SKIN : ULCERS [   ], RASH [   ], ITCHING [   ]  CNS: HEAD ACHE [   ], DOUBLE VISION [   ], BLURRED VISION [   ], AMS / CONFUSION [   ], SEIZURES [   ], WEAKNESS [   ],TINGLING / NUMBNESS [   ]    PHYSICAL EXAMINATION:  GENERAL APPEARANCE: NO DISTRESS  HEENT:  NO PALLOR, NO  JVD,  NO   NODES, NECK SUPPLE  CVS: S1 +, S2 +,   RS: AEEB,  OCCASIONAL  RALES +,   NO RONCHI  ABD: SOFT, NT, NO, BS +  EXT: PE +   SKIN: WARM,    HEALING SMALL SCABS/ERYTHEMATOUS RASH ALONG HAIRLINE, UPPER BACK AND NOTED ON SUPRCLAVICULAR AREA [NOT DRAINING], EXCORIATIONS +  SKELETAL:  ROM ACCEPTABLE  CNS:  AAO X 2-3    RADIOLOGY :  RADIOLOGY AND READINGS REVIEWED    ASSESSMENT :     Sepsis  Schizoaffective disorder  Hypertension  Hypothyroidism        PLAN:  HPI:  68 yo F, from [facility], with PMH of Bipolar disorder, Schizoaffective disorder, Anxiety, Depression HTN, Hypothyroidism, Neurologic bladder, Anemia, GERD, Cervical myelopathy, Vitiligo present with subjective fever, chills,  body aches, nausea, vomiting ( 1 episode, NBNB) that started today. Accompanied with 2 days history of itchy scalp and redness after using hair dye.  Patient  reports she felt very hot and cold today when she woke up this morning . Patient denies any cough, nasal congestion, chest pain, SOB, abdominal pain, constipation, diarrhea, urinary symptoms.    In the ED,   v/s: 128/74, 108, 101.5, 95%  RA  Labs: WBC 14k, Lactate 3 > 1.7   RSV/FLU/COVID; negative   EK BPM sinus tachycardia   CXR:  negative   s/p Tylenol, Decadron 10mg, Benadryl, Famotidine, Levaquin 750  s/p 2 L NS bolus   (23 Dec 2024 17:34)        # HYPOTENSION S/T IVVD [FROM VOMITING] LIKELY FROM EPISODE OF VOMITING - IMPROVED  # ALLERGIC REACTION TO ? HAIRDYE - IMPROVING  - IV FLUIDS  - NOTED CXR, NOTED RVP  - F/U UA  - S/P DECADRON, BENADRYL, FAMOTIDINE  - NO ORAL INVOLVEMENT, NO SWELLING - WILL MONITOR  - HOLD ABX FOR NOW  - MONITOR BLOOD CULTURE  - WILL GIVE TOPICAL TRX FOR RASH  - ID CONSULT    # CONJUNCTIVITIS  - CIPROFLOXACIN EYE DROPS  - EYE HYGIENE PER PROTOCOL    # IMPAIRED GAIT S/T OA, CERVICAL AND LS RADICULOPATHY  - F/U PT EVAL    # BIPOLAR DISORDER, SCHIZOAFFECTIVE DISORDER, ANXIETY, DEPRESSION    # HYPOTHYROIDISM  # HTN  # NEUROLOGIC BLADDER.  
Patient is a 69y old  Female who presents with a chief complaint of Dr. Elisa Granado (23 Dec 2024 17:34)    OVERNIGHT EVENTS: no acute changes.     Pt is aox3, comfortable appearing, tolerating PO, requires assistance out of bed to chair.     REVIEW OF SYSTEMS:  CONSTITUTIONAL: No fever, chills  ENMT:  No difficulty hearing, no change in vision  NECK: No pain or stiffness  RESPIRATORY: No cough, SOB  CARDIOVASCULAR: No chest pain, palpitations  GASTROINTESTINAL: No abdominal pain. No nausea, vomiting, or diarrhea  GENITOURINARY: No dysuria  NEUROLOGICAL: No HA  SKIN: +itching on scalp  LYMPH NODES: No enlarged glands  ENDOCRINE: No heat or cold intolerance; No hair loss  MUSCULOSKELETAL: No joint pain or swelling; No muscle, back, or extremity pain  PSYCHIATRIC: No depression, anxiety  HEME/LYMPH: No easy bruising, or bleeding gums    T(C): 36.4 (12-24-24 @ 12:55), Max: 36.8 (12-23-24 @ 21:48)  HR: 65 (12-24-24 @ 16:35) (65 - 84)  BP: 117/65 (12-24-24 @ 16:35) (117/65 - 132/76)  RR: 18 (12-24-24 @ 16:35) (16 - 20)  SpO2: 96% (12-24-24 @ 16:35) (95% - 97%)  Wt(kg): --Vital Signs Last 24 Hrs  T(C): 36.4 (24 Dec 2024 12:55), Max: 36.8 (23 Dec 2024 21:48)  T(F): 97.5 (24 Dec 2024 12:55), Max: 98.3 (23 Dec 2024 21:48)  HR: 65 (24 Dec 2024 16:35) (65 - 84)  BP: 117/65 (24 Dec 2024 16:35) (117/65 - 132/76)  BP(mean): --  RR: 18 (24 Dec 2024 16:35) (16 - 20)  SpO2: 96% (24 Dec 2024 16:35) (95% - 97%)    Parameters below as of 24 Dec 2024 16:35  Patient On (Oxygen Delivery Method): room air        MEDICATIONS  (STANDING):  amantadine 100 milliGRAM(s) Oral daily  aspirin enteric coated 81 milliGRAM(s) Oral daily  atorvastatin 10 milliGRAM(s) Oral at bedtime  divalproex  milliGRAM(s) Oral at bedtime  divalproex  milliGRAM(s) Oral <User Schedule>  enoxaparin Injectable 40 milliGRAM(s) SubCutaneous every 24 hours  ferrous    sulfate 325 milliGRAM(s) Oral daily  hydrocortisone 2.5% Lotion 1 Application(s) Topical daily  levothyroxine 125 MICROGram(s) Oral daily  LORazepam     Tablet 0.5 milliGRAM(s) Oral every 12 hours  melatonin 3 milliGRAM(s) Oral at bedtime  multivitamin 1 Tablet(s) Oral daily  OLANZapine 20 milliGRAM(s) Oral <User Schedule>  sertraline 200 milliGRAM(s) Oral daily    MEDICATIONS  (PRN):  acetaminophen     Tablet .. 650 milliGRAM(s) Oral every 6 hours PRN Temp greater or equal to 38C (100.4F), Mild Pain (1 - 3)  ondansetron Injectable 4 milliGRAM(s) IV Push every 8 hours PRN Nausea and/or Vomiting      PHYSICAL EXAM:  GENERAL: NAD  EYES: clear conjunctiva  ENMT: Moist mucous membranes  NECK: Supple, No JVD, Normal thyroid  CHEST/LUNG: Clear to auscultation bilaterally; No rales, rhonchi, wheezing, or rubs  HEART: S1, S2, Regular rate and rhythm  ABDOMEN: Soft, Nontender, Nondistended; Bowel sounds present  NEURO: Alert & Oriented X3  EXTREMITIES: No LE edema, no calf tenderness  LYMPH: No lymphadenopathy noted  SKIN: +dry flaky scalp with patches of erythema and abrasions.   PSYCH: +flat affect    Consultant(s) Notes Reviewed:  [x ] YES  [ ] NO  Care Discussed with Consultants/Other Providers [ x] YES  [ ] NO    LABS:                        11.4   13.79 )-----------( 231      ( 24 Dec 2024 06:24 )             35.2     12-24    142  |  110[H]  |  19[H]  ----------------------------<  105[H]  3.3[L]   |  26  |  0.69    Ca    8.6      24 Dec 2024 06:24  Phos  2.7     12-24  Mg     2.5     12-24    TPro  6.9  /  Alb  2.8[L]  /  TBili  0.2  /  DBili  x   /  AST  12  /  ALT  19  /  AlkPhos  78  12-24    PT/INR - ( 23 Dec 2024 11:00 )   PT: 11.9 sec;   INR: 1.03 ratio         PTT - ( 23 Dec 2024 11:00 )  PTT:35.7 sec  CAPILLARY BLOOD GLUCOSE      POCT Blood Glucose.: 122 mg/dL (24 Dec 2024 16:36)        Urinalysis Basic - ( 24 Dec 2024 06:24 )    Color: x / Appearance: x / SG: x / pH: x  Gluc: 105 mg/dL / Ketone: x  / Bili: x / Urobili: x   Blood: x / Protein: x / Nitrite: x   Leuk Esterase: x / RBC: x / WBC x   Sq Epi: x / Non Sq Epi: x / Bacteria: x        RADIOLOGY & ADDITIONAL TESTS:  < from: Xray Chest 1 View- PORTABLE-Urgent (12.23.24 @ 12:27) >    ACC: [id number] EXAM:  XR CHEST PORTABLE URGENT 1V   ORDERED BY: NACHO PADILLA     PROCEDURE DATE:  12/23/2024          INTERPRETATION:  Sepsis.    AP chest. Prior 5/4/2020.    Cardiac silhouette and pulmonary vasculature exaggerated by AP film   shallow inspiration.  mild atelectasis lateral left base. No gross   consolidation. No pleural effusion.    IMPRESSION: Low lung volumes. No gross consolidation    --- End of Report ---            HATTIE GILMORE MD; Attending Radiologist  This document has been electronically signed. Dec 23 2024  2:07PM    < end of copied text >      Imaging Personally Reviewed:  [ ] YES  [ ] NO

## 2024-12-26 ENCOUNTER — TRANSCRIPTION ENCOUNTER (OUTPATIENT)
Age: 69
End: 2024-12-26

## 2024-12-26 VITALS
TEMPERATURE: 98 F | DIASTOLIC BLOOD PRESSURE: 66 MMHG | HEART RATE: 75 BPM | SYSTOLIC BLOOD PRESSURE: 109 MMHG | OXYGEN SATURATION: 97 % | RESPIRATION RATE: 18 BRPM

## 2024-12-26 PROCEDURE — 97162 PT EVAL MOD COMPLEX 30 MIN: CPT

## 2024-12-26 PROCEDURE — 71045 X-RAY EXAM CHEST 1 VIEW: CPT

## 2024-12-26 PROCEDURE — 83605 ASSAY OF LACTIC ACID: CPT

## 2024-12-26 PROCEDURE — 0241U: CPT

## 2024-12-26 PROCEDURE — 84100 ASSAY OF PHOSPHORUS: CPT

## 2024-12-26 PROCEDURE — 83735 ASSAY OF MAGNESIUM: CPT

## 2024-12-26 PROCEDURE — 96375 TX/PRO/DX INJ NEW DRUG ADDON: CPT

## 2024-12-26 PROCEDURE — 85027 COMPLETE CBC AUTOMATED: CPT

## 2024-12-26 PROCEDURE — 81003 URINALYSIS AUTO W/O SCOPE: CPT

## 2024-12-26 PROCEDURE — 93005 ELECTROCARDIOGRAM TRACING: CPT

## 2024-12-26 PROCEDURE — 87637 SARSCOV2&INF A&B&RSV AMP PRB: CPT

## 2024-12-26 PROCEDURE — 99285 EMERGENCY DEPT VISIT HI MDM: CPT

## 2024-12-26 PROCEDURE — 36415 COLL VENOUS BLD VENIPUNCTURE: CPT

## 2024-12-26 PROCEDURE — 85610 PROTHROMBIN TIME: CPT

## 2024-12-26 PROCEDURE — 85730 THROMBOPLASTIN TIME PARTIAL: CPT

## 2024-12-26 PROCEDURE — 84702 CHORIONIC GONADOTROPIN TEST: CPT

## 2024-12-26 PROCEDURE — 85025 COMPLETE CBC W/AUTO DIFF WBC: CPT

## 2024-12-26 PROCEDURE — 80164 ASSAY DIPROPYLACETIC ACD TOT: CPT

## 2024-12-26 PROCEDURE — 94640 AIRWAY INHALATION TREATMENT: CPT

## 2024-12-26 PROCEDURE — 87040 BLOOD CULTURE FOR BACTERIA: CPT

## 2024-12-26 PROCEDURE — 83690 ASSAY OF LIPASE: CPT

## 2024-12-26 PROCEDURE — 96374 THER/PROPH/DIAG INJ IV PUSH: CPT

## 2024-12-26 PROCEDURE — 82962 GLUCOSE BLOOD TEST: CPT

## 2024-12-26 PROCEDURE — 87641 MR-STAPH DNA AMP PROBE: CPT

## 2024-12-26 PROCEDURE — 80048 BASIC METABOLIC PNL TOTAL CA: CPT

## 2024-12-26 PROCEDURE — 80053 COMPREHEN METABOLIC PANEL: CPT

## 2024-12-26 PROCEDURE — 87640 STAPH A DNA AMP PROBE: CPT

## 2024-12-26 RX ORDER — GUAIFENESIN 100 MG/5ML
200 SYRUP ORAL EVERY 6 HOURS
Refills: 0 | Status: DISCONTINUED | OUTPATIENT
Start: 2024-12-26 | End: 2024-12-26

## 2024-12-26 RX ORDER — HYDROCORTISONE 1 %
1 CREAM (GRAM) TOPICAL
Qty: 0 | Refills: 0 | DISCHARGE
Start: 2024-12-26

## 2024-12-26 RX ORDER — MUPIROCIN 2 %
1 OINTMENT (GRAM) TOPICAL
Qty: 0 | Refills: 0 | DISCHARGE
Start: 2024-12-26

## 2024-12-26 RX ORDER — OXYMETAZOLINE HYDROCHLORIDE 0.05 G/100ML
1 SPRAY, METERED NASAL
Qty: 0 | Refills: 0 | DISCHARGE
Start: 2024-12-26

## 2024-12-26 RX ORDER — FLUTICASONE PROPIONATE 50 UG/1
1 SPRAY, METERED NASAL
Qty: 0 | Refills: 0 | DISCHARGE
Start: 2024-12-26

## 2024-12-26 RX ORDER — OXYMETAZOLINE HYDROCHLORIDE 0.05 G/100ML
1 SPRAY, METERED NASAL
Refills: 0 | Status: DISCONTINUED | OUTPATIENT
Start: 2024-12-26 | End: 2024-12-26

## 2024-12-26 RX ORDER — FLUTICASONE PROPIONATE 50 UG/1
1 SPRAY, METERED NASAL
Refills: 0 | Status: DISCONTINUED | OUTPATIENT
Start: 2024-12-26 | End: 2024-12-26

## 2024-12-26 RX ORDER — GUAIFENESIN 100 MG/5ML
10 SYRUP ORAL
Qty: 0 | Refills: 0 | DISCHARGE
Start: 2024-12-26

## 2024-12-26 RX ORDER — CIPROFLOXACIN 3 MG/ML
1 SOLUTION OPHTHALMIC
Qty: 0 | Refills: 0 | DISCHARGE
Start: 2024-12-26

## 2024-12-26 RX ADMIN — SERTRALINE HYDROCHLORIDE 200 MILLIGRAM(S): 25 TABLET ORAL at 13:35

## 2024-12-26 RX ADMIN — Medication 81 MILLIGRAM(S): at 12:10

## 2024-12-26 RX ADMIN — DIVALPROEX SODIUM 500 MILLIGRAM(S): 500 TABLET, DELAYED RELEASE ORAL at 05:11

## 2024-12-26 RX ADMIN — Medication 1 TABLET(S): at 12:10

## 2024-12-26 RX ADMIN — LEVOTHYROXINE SODIUM 125 MICROGRAM(S): 175 TABLET ORAL at 05:10

## 2024-12-26 RX ADMIN — CIPROFLOXACIN 1 DROP(S): 3 SOLUTION OPHTHALMIC at 00:06

## 2024-12-26 RX ADMIN — Medication 1 APPLICATION(S): at 05:11

## 2024-12-26 RX ADMIN — Medication 325 MILLIGRAM(S): at 12:13

## 2024-12-26 RX ADMIN — Medication 1 APPLICATION(S): at 12:12

## 2024-12-26 RX ADMIN — FLUTICASONE PROPIONATE 1 SPRAY(S): 50 SPRAY, METERED NASAL at 15:50

## 2024-12-26 RX ADMIN — CIPROFLOXACIN 1 DROP(S): 3 SOLUTION OPHTHALMIC at 12:08

## 2024-12-26 RX ADMIN — LORAZEPAM 0.5 MILLIGRAM(S): 1 TABLET ORAL at 05:11

## 2024-12-26 RX ADMIN — OXYMETAZOLINE HYDROCHLORIDE 1 SPRAY(S): 0.05 SPRAY, METERED NASAL at 15:50

## 2024-12-26 RX ADMIN — Medication 200 MILLIGRAM(S): at 15:50

## 2024-12-26 RX ADMIN — CHLORHEXIDINE GLUCONATE 1 APPLICATION(S): 1.2 RINSE ORAL at 12:09

## 2024-12-26 RX ADMIN — AMANTADINE HYDROCHLORIDE 100 MILLIGRAM(S): 100 CAPSULE ORAL at 12:11

## 2024-12-26 RX ADMIN — OLANZAPINE 20 MILLIGRAM(S): 15 TABLET ORAL at 09:46

## 2024-12-26 RX ADMIN — CIPROFLOXACIN 1 DROP(S): 3 SOLUTION OPHTHALMIC at 05:11

## 2024-12-26 NOTE — DISCHARGE NOTE PROVIDER - CARE PROVIDER_API CALL
Loy Pérez Robledo  Internal Medicine  [address] [facility], [address] [place] [id number]  Phone: [phone]  Fax: [phone]  Follow Up Time:

## 2024-12-26 NOTE — DISCHARGE NOTE NURSING/CASE MANAGEMENT/SOCIAL WORK - FINANCIAL ASSISTANCE
[facility] provides services at a reduced cost to those who are determined to be eligible through [facility]’s financial assistance program. Information regarding [facility]’s financial assistance program can be found by going to [email or web address] or by calling [phone].

## 2024-12-26 NOTE — DISCHARGE NOTE PROVIDER - NSDCMRMEDTOKEN_GEN_ALL_CORE_FT
amantadine 100 mg oral tablet: 1 tab(s) orally once a day (in the morning)  aspirin 81 mg oral delayed release tablet: 1 tab(s) orally once a day  atorvastatin 10 mg oral tablet: 1 tab(s) orally once a day (at bedtime)  ciprofloxacin 0.3% ophthalmic solution: 1 drop(s) to each affected eye 4 times a day  divalproex sodium 250 mg oral delayed release tablet: 3 tab(s) orally once a day (at bedtime)  divalproex sodium 500 mg oral delayed release tablet: 1 tab(s) orally once a day (in the morning)  docusate sodium 100 mg oral capsule: 2 cap(s) orally once a day (in the evening)  ferrous sulfate 325 mg (65 mg elemental iron) oral delayed release tablet: 1 tab(s) orally once a day  hydrocortisone 2.5% topical lotion: 1 Apply topically to affected area once a day  levothyroxine 125 mcg (0.125 mg) oral tablet: 1 tab(s) orally once a day  LORazepam 0.5 mg oral tablet: 1 tab(s) orally every 12 hours  Melatonin 3 mg oral tablet: 1 tab(s) orally once a day (at bedtime)  Multiple Vitamins oral tablet: 1 tab(s) orally once a day  mupirocin 2% topical ointment: 1 Apply topically to affected area 2 times a day  OLANZapine 20 mg oral tablet: 1 tab(s) orally 2 times a day at 9 AM and 9 PM  sertraline 100 mg oral tablet: 2 tab(s) orally once a day  Tylenol 325 mg oral tablet: 2 tab(s) orally every 8 hours as needed for  pain  Vitamin D3 1000 intl units (25 mcg) oral tablet: 1 tab(s) orally once a day   amantadine 100 mg oral tablet: 1 tab(s) orally once a day (in the morning)  aspirin 81 mg oral delayed release tablet: 1 tab(s) orally once a day  atorvastatin 10 mg oral tablet: 1 tab(s) orally once a day (at bedtime)  ciprofloxacin 0.3% ophthalmic solution: 1 drop(s) to each affected eye 4 times a day  divalproex sodium 250 mg oral delayed release tablet: 3 tab(s) orally once a day (at bedtime)  divalproex sodium 500 mg oral delayed release tablet: 1 tab(s) orally once a day (in the morning)  docusate sodium 100 mg oral capsule: 2 cap(s) orally once a day (in the evening)  ferrous sulfate 325 mg (65 mg elemental iron) oral delayed release tablet: 1 tab(s) orally once a day  Flonase 50 mcg/inh nasal spray: 1 spray(s) nasal 2 times a day  guaiFENesin 100 mg/5 mL oral liquid: 10 milliliter(s) orally every 6 hours  hydrocortisone 2.5% topical lotion: 1 Apply topically to affected area once a day  levothyroxine 125 mcg (0.125 mg) oral tablet: 1 tab(s) orally once a day  LORazepam 0.5 mg oral tablet: 1 tab(s) orally every 12 hours  Melatonin 3 mg oral tablet: 1 tab(s) orally once a day (at bedtime)  Multiple Vitamins oral tablet: 1 tab(s) orally once a day  mupirocin 2% topical ointment: 1 Apply topically to affected area 2 times a day  OLANZapine 20 mg oral tablet: 1 tab(s) orally 2 times a day at 9 AM and 9 PM  oxymetazoline 0.05% nasal spray: 1 applicator nasal 2 times a day  sertraline 100 mg oral tablet: 2 tab(s) orally once a day  Tylenol 325 mg oral tablet: 2 tab(s) orally every 8 hours as needed for  pain  Vitamin D3 1000 intl units (25 mcg) oral tablet: 1 tab(s) orally once a day

## 2024-12-26 NOTE — DISCHARGE NOTE PROVIDER - NSDCCPCAREPLAN_GEN_ALL_CORE_FT
PRINCIPAL DISCHARGE DIAGNOSIS  Diagnosis: Irritant contact dermatitis of scalp  Assessment and Plan of Treatment: You were admitted for scalp rash  possible allergic reaction to hair dye  You were given dose of oral steroids and continued with Hydrocortizone scalp lotion  Itchiness and rush subsided   No other signs of infection   Continue with steroid cream for 7 more days      SECONDARY DISCHARGE DIAGNOSES  Diagnosis: Conjunctivitis  Assessment and Plan of Treatment: Continue ciprofloxacin eye drops four times a day for 9 more days   Follow up with your doctor   Wash your hands often   Avoid touching your eyes with unwashed hands    Diagnosis: Schizoaffective disorder  Assessment and Plan of Treatment: Continue all your medications as prescribed by your doctor   Follow up with Dr Victor    Diagnosis: Hypothyroidism  Assessment and Plan of Treatment: Continue your medications as prescribed by your doctor       Diagnosis: Bipolar disorder  Assessment and Plan of Treatment: Continue all your medications as prescribed by your doctor   Follow up with Dr Victor    Diagnosis: HLD (hyperlipidemia)  Assessment and Plan of Treatment: Continue all your medications and follow up with your doctor    Diagnosis: HTN (hypertension)  Assessment and Plan of Treatment: Continue all your medications and follow up with your doctor   Recommend the DASH Diet. This diet emphasizes vegetables, fruits, and fat-free or low-fat dairy products.  Includes whole grains, fish, poultry, beans, seeds, nuts, and vegetable oils. Please limit sodium, sweets, sugary beverages, and red meats.

## 2024-12-26 NOTE — DISCHARGE NOTE PROVIDER - HOSPITAL COURSE
68 yo F, from [facility], with PMH of Bipolar disorder, Schizoaffective disorder, Anxiety, Depression,  HTN, Hypothyroidism, Neurologic bladder, Anemia, GERD, Cervical myelopathy, Vitiligo present with subjective fever, chills,  body aches, nausea, vomiting ( 1 episode, NBNB)  x1 day. Accompanied with 2 days history of itchy scalp and redness after using hair dye. Admitted to medicine to r/o sepsis, lactic acidosis, possible allergic reaction to hair dye  lactate 3, UA negative, CXR clear, RVP negative, blood culture no growth   Noted with conjunctivitis   Psych Dr. Taveras  consulted for assistance with medication management. ID Dr. Briscoe consulted   Pt was evaluated by  PT recommended home PT     s/p Tylenol, Decadron 10mg, Benadryl, Famotidine, Levaquin 750 mg, 2 L NS bolus  Started on Hydrocortizone scalp lotion, cyproflaxicine eye drops for conjunctivitis   Lactate normalized   Itchiness and rush subsided   No other signs of infection   Given patient's improved clinical status and current hemodynamic stability, decision was made to discharge.  Recommended to continue Hydrocortizone daily to scalp and cyproflaxicine  for 9 more days to bilateral eye

## 2024-12-26 NOTE — DISCHARGE NOTE NURSING/CASE MANAGEMENT/SOCIAL WORK - PATIENT PORTAL LINK FT
You can access the FollowMyHealth Patient Portal offered by [facility] by registering at the following website: [email or web address]. By joining [email or web address]’s FollowMyHealth portal, you will also be able to view your health information using other applications (apps) compatible with our system.
